# Patient Record
Sex: FEMALE | Race: WHITE | Employment: FULL TIME | URBAN - NONMETROPOLITAN AREA
[De-identification: names, ages, dates, MRNs, and addresses within clinical notes are randomized per-mention and may not be internally consistent; named-entity substitution may affect disease eponyms.]

---

## 2017-01-01 ENCOUNTER — OFFICE VISIT (OUTPATIENT)
Dept: FAMILY MEDICINE | Facility: OTHER | Age: 67
End: 2017-01-01
Attending: FAMILY MEDICINE
Payer: MEDICARE

## 2017-01-01 ENCOUNTER — ONCOLOGY VISIT (OUTPATIENT)
Dept: ONCOLOGY | Facility: CLINIC | Age: 67
End: 2017-01-01
Attending: INTERNAL MEDICINE
Payer: MEDICARE

## 2017-01-01 ENCOUNTER — INFUSION THERAPY VISIT (OUTPATIENT)
Dept: INFUSION THERAPY | Facility: OTHER | Age: 67
End: 2017-01-01
Attending: INTERNAL MEDICINE
Payer: MEDICARE

## 2017-01-01 ENCOUNTER — RESULTS ONLY (OUTPATIENT)
Dept: LAB | Age: 67
End: 2017-01-01

## 2017-01-01 ENCOUNTER — MEDICAL CORRESPONDENCE (OUTPATIENT)
Dept: HEALTH INFORMATION MANAGEMENT | Facility: HOSPITAL | Age: 67
End: 2017-01-01

## 2017-01-01 ENCOUNTER — TRANSFERRED RECORDS (OUTPATIENT)
Dept: HEALTH INFORMATION MANAGEMENT | Facility: HOSPITAL | Age: 67
End: 2017-01-01

## 2017-01-01 ENCOUNTER — TELEPHONE (OUTPATIENT)
Dept: ONCOLOGY | Facility: CLINIC | Age: 67
End: 2017-01-01

## 2017-01-01 ENCOUNTER — DOCUMENTATION ONLY (OUTPATIENT)
Dept: FAMILY MEDICINE | Facility: OTHER | Age: 67
End: 2017-01-01

## 2017-01-01 ENCOUNTER — ONCOLOGY VISIT (OUTPATIENT)
Dept: RADIATION ONCOLOGY | Facility: HOSPITAL | Age: 67
End: 2017-01-01
Attending: RADIOLOGY
Payer: MEDICARE

## 2017-01-01 ENCOUNTER — OFFICE VISIT (OUTPATIENT)
Dept: PEDIATRICS | Facility: OTHER | Age: 67
End: 2017-01-01
Attending: INTERNAL MEDICINE
Payer: MEDICARE

## 2017-01-01 ENCOUNTER — APPOINTMENT (OUTPATIENT)
Dept: LAB | Facility: CLINIC | Age: 67
End: 2017-01-01
Attending: INTERNAL MEDICINE
Payer: MEDICARE

## 2017-01-01 ENCOUNTER — TRANSFERRED RECORDS (OUTPATIENT)
Dept: HEALTH INFORMATION MANAGEMENT | Facility: CLINIC | Age: 67
End: 2017-01-01

## 2017-01-01 ENCOUNTER — TELEPHONE (OUTPATIENT)
Dept: PEDIATRICS | Facility: OTHER | Age: 67
End: 2017-01-01

## 2017-01-01 VITALS
BODY MASS INDEX: 22.53 KG/M2 | RESPIRATION RATE: 16 BRPM | WEIGHT: 132 LBS | HEART RATE: 68 BPM | HEIGHT: 64 IN | SYSTOLIC BLOOD PRESSURE: 122 MMHG | DIASTOLIC BLOOD PRESSURE: 70 MMHG

## 2017-01-01 VITALS
WEIGHT: 134.04 LBS | SYSTOLIC BLOOD PRESSURE: 124 MMHG | TEMPERATURE: 98.9 F | DIASTOLIC BLOOD PRESSURE: 56 MMHG | OXYGEN SATURATION: 99 % | BODY MASS INDEX: 23.01 KG/M2 | RESPIRATION RATE: 16 BRPM | HEART RATE: 77 BPM

## 2017-01-01 VITALS
TEMPERATURE: 99.6 F | BODY MASS INDEX: 21.11 KG/M2 | HEART RATE: 100 BPM | DIASTOLIC BLOOD PRESSURE: 56 MMHG | WEIGHT: 123 LBS | RESPIRATION RATE: 16 BRPM | OXYGEN SATURATION: 99 % | SYSTOLIC BLOOD PRESSURE: 98 MMHG

## 2017-01-01 VITALS
HEART RATE: 74 BPM | SYSTOLIC BLOOD PRESSURE: 93 MMHG | HEIGHT: 64 IN | DIASTOLIC BLOOD PRESSURE: 48 MMHG | TEMPERATURE: 98.1 F | BODY MASS INDEX: 24.04 KG/M2 | RESPIRATION RATE: 16 BRPM | OXYGEN SATURATION: 100 % | WEIGHT: 140.8 LBS

## 2017-01-01 VITALS
SYSTOLIC BLOOD PRESSURE: 124 MMHG | HEART RATE: 69 BPM | BODY MASS INDEX: 22.66 KG/M2 | TEMPERATURE: 98.2 F | DIASTOLIC BLOOD PRESSURE: 72 MMHG | OXYGEN SATURATION: 99 % | WEIGHT: 132 LBS

## 2017-01-01 VITALS
TEMPERATURE: 99.4 F | HEIGHT: 64 IN | OXYGEN SATURATION: 100 % | SYSTOLIC BLOOD PRESSURE: 129 MMHG | WEIGHT: 139 LBS | DIASTOLIC BLOOD PRESSURE: 59 MMHG | HEART RATE: 81 BPM | BODY MASS INDEX: 23.73 KG/M2 | RESPIRATION RATE: 16 BRPM

## 2017-01-01 VITALS
SYSTOLIC BLOOD PRESSURE: 130 MMHG | HEART RATE: 75 BPM | OXYGEN SATURATION: 99 % | RESPIRATION RATE: 16 BRPM | DIASTOLIC BLOOD PRESSURE: 50 MMHG | TEMPERATURE: 97.8 F

## 2017-01-01 DIAGNOSIS — N39.0 URINARY TRACT INFECTION WITHOUT HEMATURIA, SITE UNSPECIFIED: Primary | ICD-10-CM

## 2017-01-01 DIAGNOSIS — C34.90 SMALL CELL LUNG CANCER, UNSPECIFIED LATERALITY (H): Primary | ICD-10-CM

## 2017-01-01 DIAGNOSIS — R60.9 EDEMA: ICD-10-CM

## 2017-01-01 DIAGNOSIS — K21.9 GASTROESOPHAGEAL REFLUX DISEASE, ESOPHAGITIS PRESENCE NOT SPECIFIED: Primary | ICD-10-CM

## 2017-01-01 DIAGNOSIS — K59.01 SLOW TRANSIT CONSTIPATION: Primary | ICD-10-CM

## 2017-01-01 DIAGNOSIS — N39.0 URINARY TRACT INFECTION, SITE UNSPECIFIED: Primary | ICD-10-CM

## 2017-01-01 DIAGNOSIS — C79.51 BONE METASTASIS: ICD-10-CM

## 2017-01-01 DIAGNOSIS — K21.9 GASTROESOPHAGEAL REFLUX DISEASE, ESOPHAGITIS PRESENCE NOT SPECIFIED: ICD-10-CM

## 2017-01-01 DIAGNOSIS — C34.90 SMALL CELL LUNG CANCER, UNSPECIFIED LATERALITY (H): ICD-10-CM

## 2017-01-01 DIAGNOSIS — C79.9 METASTATIC CANCER (H): Primary | ICD-10-CM

## 2017-01-01 DIAGNOSIS — R07.9 CHEST PAIN: ICD-10-CM

## 2017-01-01 DIAGNOSIS — I25.10 CAD (CORONARY ARTERY DISEASE): ICD-10-CM

## 2017-01-01 DIAGNOSIS — C34.12 CANCER OF UPPER LOBE OF LEFT LUNG (H): Primary | ICD-10-CM

## 2017-01-01 DIAGNOSIS — K59.01 SLOW TRANSIT CONSTIPATION: ICD-10-CM

## 2017-01-01 DIAGNOSIS — I25.10 CORONARY ARTERY DISEASE INVOLVING NATIVE CORONARY ARTERY OF NATIVE HEART WITHOUT ANGINA PECTORIS: ICD-10-CM

## 2017-01-01 DIAGNOSIS — R60.9 EDEMA: Primary | ICD-10-CM

## 2017-01-01 DIAGNOSIS — Z45.2 FITTING AND ADJUSTMENT OF VASCULAR CATHETER: ICD-10-CM

## 2017-01-01 DIAGNOSIS — Z45.2 FITTING AND ADJUSTMENT OF VASCULAR CATHETER: Primary | ICD-10-CM

## 2017-01-01 DIAGNOSIS — E78.5 HYPERLIPIDEMIA, UNSPECIFIED HYPERLIPIDEMIA TYPE: ICD-10-CM

## 2017-01-01 DIAGNOSIS — F33.1 MAJOR DEPRESSIVE DISORDER, RECURRENT EPISODE, MODERATE (H): ICD-10-CM

## 2017-01-01 DIAGNOSIS — C79.31 BRAIN METASTASIS: Primary | ICD-10-CM

## 2017-01-01 LAB
ALBUMIN SERPL-MCNC: 3.3 G/DL (ref 3.4–5)
ALBUMIN SERPL-MCNC: 3.5 G/DL (ref 3.4–5)
ALBUMIN UR-MCNC: 100 MG/DL
ALP SERPL-CCNC: 108 U/L (ref 40–150)
ALP SERPL-CCNC: 85 U/L (ref 40–150)
ALT SERPL W P-5'-P-CCNC: 19 U/L (ref 0–50)
ALT SERPL W P-5'-P-CCNC: 26 U/L (ref 0–50)
ANION GAP SERPL CALCULATED.3IONS-SCNC: 6 MMOL/L (ref 3–14)
ANION GAP SERPL CALCULATED.3IONS-SCNC: 7 MMOL/L (ref 3–14)
APPEARANCE UR: ABNORMAL
AST SERPL W P-5'-P-CCNC: 12 U/L (ref 0–45)
AST SERPL W P-5'-P-CCNC: 19 U/L (ref 0–45)
BACTERIA #/AREA URNS HPF: ABNORMAL /HPF
BACTERIA SPEC CULT: ABNORMAL
BASOPHILS # BLD AUTO: 0 10E9/L (ref 0–0.2)
BASOPHILS # BLD AUTO: 0.1 10E9/L (ref 0–0.2)
BASOPHILS NFR BLD AUTO: 0.2 %
BASOPHILS NFR BLD AUTO: 0.8 %
BILIRUB SERPL-MCNC: 0.2 MG/DL (ref 0.2–1.3)
BILIRUB SERPL-MCNC: 0.2 MG/DL (ref 0.2–1.3)
BILIRUB UR QL STRIP: NEGATIVE
BUN SERPL-MCNC: 7 MG/DL (ref 7–30)
BUN SERPL-MCNC: 9 MG/DL (ref 7–30)
CALCIUM SERPL-MCNC: 8.6 MG/DL (ref 8.5–10.1)
CALCIUM SERPL-MCNC: 8.7 MG/DL (ref 8.5–10.1)
CHLORIDE SERPL-SCNC: 103 MMOL/L (ref 94–109)
CHLORIDE SERPL-SCNC: 104 MMOL/L (ref 94–109)
CHOLEST SERPL-MCNC: 163 MG/DL
CO2 SERPL-SCNC: 30 MMOL/L (ref 20–32)
CO2 SERPL-SCNC: 31 MMOL/L (ref 20–32)
COLOR UR AUTO: YELLOW
CREAT SERPL-MCNC: 0.48 MG/DL (ref 0.52–1.04)
CREAT SERPL-MCNC: 0.57 MG/DL (ref 0.52–1.04)
DIFFERENTIAL METHOD BLD: ABNORMAL
DIFFERENTIAL METHOD BLD: ABNORMAL
EOSINOPHIL # BLD AUTO: 0.1 10E9/L (ref 0–0.7)
EOSINOPHIL # BLD AUTO: 0.2 10E9/L (ref 0–0.7)
EOSINOPHIL NFR BLD AUTO: 0.4 %
EOSINOPHIL NFR BLD AUTO: 2.1 %
ERYTHROCYTE [DISTWIDTH] IN BLOOD BY AUTOMATED COUNT: 15.5 % (ref 10–15)
ERYTHROCYTE [DISTWIDTH] IN BLOOD BY AUTOMATED COUNT: 17.7 % (ref 10–15)
GFR SERPL CREATININE-BSD FRML MDRD: ABNORMAL ML/MIN/1.7M2
GFR SERPL CREATININE-BSD FRML MDRD: ABNORMAL ML/MIN/1.7M2
GLUCOSE SERPL-MCNC: 113 MG/DL (ref 70–99)
GLUCOSE SERPL-MCNC: 122 MG/DL (ref 70–99)
GLUCOSE UR STRIP-MCNC: NEGATIVE MG/DL
HCT VFR BLD AUTO: 28.9 % (ref 35–47)
HCT VFR BLD AUTO: 35.2 % (ref 35–47)
HDLC SERPL-MCNC: 64 MG/DL
HGB BLD-MCNC: 11 G/DL (ref 11.7–15.7)
HGB BLD-MCNC: 8.1 G/DL (ref 11.7–15.7)
HGB BLD-MCNC: 9 G/DL (ref 11.7–15.7)
HGB BLD-MCNC: 9 G/DL (ref 11.7–15.7)
HGB UR QL STRIP: ABNORMAL
HYALINE CASTS #/AREA URNS LPF: 29 /LPF (ref 0–2)
IMM GRANULOCYTES # BLD: 0 10E9/L (ref 0–0.4)
IMM GRANULOCYTES # BLD: 0.2 10E9/L (ref 0–0.4)
IMM GRANULOCYTES NFR BLD: 0.4 %
IMM GRANULOCYTES NFR BLD: 1.1 %
KETONES UR STRIP-MCNC: NEGATIVE MG/DL
LDH SERPL L TO P-CCNC: 137 U/L (ref 81–234)
LDLC SERPL CALC-MCNC: 65 MG/DL
LEUKOCYTE ESTERASE UR QL STRIP: ABNORMAL
LYMPHOCYTES # BLD AUTO: 1.3 10E9/L (ref 0.8–5.3)
LYMPHOCYTES # BLD AUTO: 1.5 10E9/L (ref 0.8–5.3)
LYMPHOCYTES NFR BLD AUTO: 18.3 %
LYMPHOCYTES NFR BLD AUTO: 9 %
MAGNESIUM SERPL-MCNC: 2.4 MG/DL (ref 1.6–2.3)
MCH RBC QN AUTO: 31.2 PG (ref 26.5–33)
MCH RBC QN AUTO: 32.4 PG (ref 26.5–33)
MCHC RBC AUTO-ENTMCNC: 31.1 G/DL (ref 31.5–36.5)
MCHC RBC AUTO-ENTMCNC: 31.3 G/DL (ref 31.5–36.5)
MCV RBC AUTO: 100 FL (ref 78–100)
MCV RBC AUTO: 104 FL (ref 78–100)
MICRO REPORT STATUS: ABNORMAL
MICROORGANISM SPEC CULT: ABNORMAL
MONOCYTES # BLD AUTO: 0.7 10E9/L (ref 0–1.3)
MONOCYTES # BLD AUTO: 1.4 10E9/L (ref 0–1.3)
MONOCYTES NFR BLD AUTO: 10.2 %
MONOCYTES NFR BLD AUTO: 8.5 %
NEUTROPHILS # BLD AUTO: 11 10E9/L (ref 1.6–8.3)
NEUTROPHILS # BLD AUTO: 5.6 10E9/L (ref 1.6–8.3)
NEUTROPHILS NFR BLD AUTO: 69.9 %
NEUTROPHILS NFR BLD AUTO: 79.1 %
NITRATE UR QL: NEGATIVE
NONHDLC SERPL-MCNC: 99 MG/DL
NRBC # BLD AUTO: 0 10*3/UL
NRBC # BLD AUTO: 0 10*3/UL
NRBC BLD AUTO-RTO: 0 /100
NRBC BLD AUTO-RTO: 0 /100
PH UR STRIP: 6 PH (ref 4.7–8)
PLATELET # BLD AUTO: 271 10E9/L (ref 150–450)
PLATELET # BLD AUTO: 354 10E9/L (ref 150–450)
POTASSIUM SERPL-SCNC: 3.7 MMOL/L (ref 3.4–5.3)
POTASSIUM SERPL-SCNC: 4.4 MMOL/L (ref 3.4–5.3)
PROT SERPL-MCNC: 7 G/DL (ref 6.8–8.8)
PROT SERPL-MCNC: 7.3 G/DL (ref 6.8–8.8)
RBC # BLD AUTO: 2.78 10E12/L (ref 3.8–5.2)
RBC # BLD AUTO: 3.53 10E12/L (ref 3.8–5.2)
RBC #/AREA URNS AUTO: 180 /HPF (ref 0–2)
SODIUM SERPL-SCNC: 140 MMOL/L (ref 133–144)
SODIUM SERPL-SCNC: 141 MMOL/L (ref 133–144)
SP GR UR STRIP: 1.02 (ref 1–1.03)
SPECIMEN SOURCE: ABNORMAL
SQUAMOUS #/AREA URNS AUTO: 7 /HPF (ref 0–1)
TRANS CELLS #/AREA URNS HPF: 2 /HPF (ref 0–1)
TRIGL SERPL-MCNC: 170 MG/DL
URN SPEC COLLECT METH UR: ABNORMAL
UROBILINOGEN UR STRIP-MCNC: NORMAL MG/DL (ref 0–2)
WBC # BLD AUTO: 13.9 10E9/L (ref 4–11)
WBC # BLD AUTO: 8 10E9/L (ref 4–11)
WBC #/AREA URNS AUTO: >182 /HPF (ref 0–2)
WBC CLUMPS #/AREA URNS HPF: PRESENT /HPF

## 2017-01-01 PROCEDURE — 80053 COMPREHEN METABOLIC PANEL: CPT | Performed by: INTERNAL MEDICINE

## 2017-01-01 PROCEDURE — 99212 OFFICE O/P EST SF 10 MIN: CPT

## 2017-01-01 PROCEDURE — 85018 HEMOGLOBIN: CPT | Performed by: INTERNAL MEDICINE

## 2017-01-01 PROCEDURE — 96372 THER/PROPH/DIAG INJ SC/IM: CPT

## 2017-01-01 PROCEDURE — 36415 COLL VENOUS BLD VENIPUNCTURE: CPT | Performed by: FAMILY MEDICINE

## 2017-01-01 PROCEDURE — 36415 COLL VENOUS BLD VENIPUNCTURE: CPT | Performed by: INTERNAL MEDICINE

## 2017-01-01 PROCEDURE — 87186 SC STD MICRODIL/AGAR DIL: CPT | Performed by: INTERNAL MEDICINE

## 2017-01-01 PROCEDURE — 25000128 H RX IP 250 OP 636: Performed by: INTERNAL MEDICINE

## 2017-01-01 PROCEDURE — 96523 IRRIG DRUG DELIVERY DEVICE: CPT

## 2017-01-01 PROCEDURE — 83735 ASSAY OF MAGNESIUM: CPT | Performed by: INTERNAL MEDICINE

## 2017-01-01 PROCEDURE — 99214 OFFICE O/P EST MOD 30 MIN: CPT | Performed by: INTERNAL MEDICINE

## 2017-01-01 PROCEDURE — 83615 LACTATE (LD) (LDH) ENZYME: CPT | Performed by: INTERNAL MEDICINE

## 2017-01-01 PROCEDURE — 81001 URINALYSIS AUTO W/SCOPE: CPT | Performed by: INTERNAL MEDICINE

## 2017-01-01 PROCEDURE — 85025 COMPLETE CBC W/AUTO DIFF WBC: CPT | Performed by: INTERNAL MEDICINE

## 2017-01-01 PROCEDURE — 99213 OFFICE O/P EST LOW 20 MIN: CPT | Performed by: RADIOLOGY

## 2017-01-01 PROCEDURE — 80061 LIPID PANEL: CPT | Performed by: INTERNAL MEDICINE

## 2017-01-01 PROCEDURE — 85018 HEMOGLOBIN: CPT | Performed by: FAMILY MEDICINE

## 2017-01-01 PROCEDURE — 99207 ZZC NO CHARGE LOS: CPT | Performed by: FAMILY MEDICINE

## 2017-01-01 PROCEDURE — 99213 OFFICE O/P EST LOW 20 MIN: CPT | Performed by: INTERNAL MEDICINE

## 2017-01-01 PROCEDURE — 87088 URINE BACTERIA CULTURE: CPT | Performed by: INTERNAL MEDICINE

## 2017-01-01 PROCEDURE — 87086 URINE CULTURE/COLONY COUNT: CPT | Performed by: INTERNAL MEDICINE

## 2017-01-01 PROCEDURE — 99214 OFFICE O/P EST MOD 30 MIN: CPT | Mod: ZP | Performed by: INTERNAL MEDICINE

## 2017-01-01 RX ORDER — OXYCODONE HYDROCHLORIDE 5 MG/1
5-10 TABLET ORAL EVERY 4 HOURS PRN
Qty: 200 TABLET | Refills: 0 | Status: SHIPPED | OUTPATIENT
Start: 2017-01-01

## 2017-01-01 RX ORDER — BUPROPION HYDROCHLORIDE 150 MG/1
TABLET, EXTENDED RELEASE ORAL
Qty: 90 TABLET | Refills: 0 | Status: SHIPPED | OUTPATIENT
Start: 2017-01-01

## 2017-01-01 RX ORDER — HYDROCODONE BITARTRATE AND ACETAMINOPHEN 5; 325 MG/1; MG/1
TABLET ORAL
Qty: 120 TABLET | Refills: 0 | Status: SHIPPED | OUTPATIENT
Start: 2017-01-01 | End: 2017-01-01 | Stop reason: ALTCHOICE

## 2017-01-01 RX ORDER — NITROGLYCERIN 0.4 MG/1
TABLET SUBLINGUAL
Qty: 100 TABLET | Refills: 0 | Status: SHIPPED | OUTPATIENT
Start: 2017-01-01 | End: 2017-01-01

## 2017-01-01 RX ORDER — NITROFURANTOIN 25; 75 MG/1; MG/1
100 CAPSULE ORAL 2 TIMES DAILY
Qty: 10 CAPSULE | Refills: 0 | Status: SHIPPED | OUTPATIENT
Start: 2017-01-01 | End: 2017-01-01

## 2017-01-01 RX ORDER — FUROSEMIDE 20 MG
TABLET ORAL
Qty: 90 TABLET | Refills: 1 | Status: SHIPPED | OUTPATIENT
Start: 2017-01-01

## 2017-01-01 RX ORDER — OXYCODONE HYDROCHLORIDE 5 MG/1
5-10 TABLET ORAL EVERY 4 HOURS PRN
Qty: 18 TABLET | Refills: 0 | Status: SHIPPED | OUTPATIENT
Start: 2017-01-01

## 2017-01-01 RX ORDER — SIMVASTATIN 20 MG
TABLET ORAL
Qty: 90 TABLET | Refills: 0 | Status: SHIPPED | OUTPATIENT
Start: 2017-01-01

## 2017-01-01 RX ORDER — POTASSIUM CHLORIDE 1.5 G/1.58G
20 POWDER, FOR SOLUTION ORAL 2 TIMES DAILY
COMMUNITY

## 2017-01-01 RX ORDER — MORPHINE SULFATE 30 MG/1
30 TABLET, FILM COATED, EXTENDED RELEASE ORAL EVERY 12 HOURS
Qty: 60 TABLET | Refills: 0 | Status: SHIPPED | OUTPATIENT
Start: 2017-01-01 | End: 2017-01-01

## 2017-01-01 RX ORDER — METOPROLOL SUCCINATE 25 MG/1
25 TABLET, EXTENDED RELEASE ORAL DAILY
Qty: 30 TABLET | Refills: 11 | Status: SHIPPED | OUTPATIENT
Start: 2017-01-01

## 2017-01-01 RX ORDER — HYDROCODONE BITARTRATE AND ACETAMINOPHEN 5; 325 MG/1; MG/1
TABLET ORAL
Refills: 0 | OUTPATIENT
Start: 2017-01-01

## 2017-01-01 RX ORDER — MORPHINE SULFATE 15 MG/1
TABLET ORAL
Qty: 220 TABLET | Refills: 0 | Status: SHIPPED | OUTPATIENT
Start: 2017-01-01 | End: 2017-01-01 | Stop reason: ALTCHOICE

## 2017-01-01 RX ORDER — METOPROLOL SUCCINATE 25 MG/1
25 TABLET, EXTENDED RELEASE ORAL DAILY
Qty: 30 TABLET | Refills: 11 | Status: SHIPPED | OUTPATIENT
Start: 2017-01-01 | End: 2017-01-01

## 2017-01-01 RX ORDER — MORPHINE SULFATE 30 MG/1
30 TABLET, FILM COATED, EXTENDED RELEASE ORAL EVERY 12 HOURS
COMMUNITY
Start: 2017-01-01

## 2017-01-01 RX ORDER — MORPHINE SULFATE 30 MG/1
TABLET, FILM COATED, EXTENDED RELEASE ORAL
Qty: 60 TABLET | Refills: 0 | COMMUNITY
Start: 2017-01-01

## 2017-01-01 RX ORDER — MORPHINE SULFATE 15 MG/1
TABLET ORAL
Refills: 0 | OUTPATIENT
Start: 2017-01-01

## 2017-01-01 RX ORDER — OMEPRAZOLE 40 MG/1
40 CAPSULE, DELAYED RELEASE ORAL DAILY
Qty: 90 CAPSULE | Refills: 3 | Status: SHIPPED | OUTPATIENT
Start: 2017-01-01 | End: 2017-01-01

## 2017-01-01 RX ORDER — SENNOSIDES A AND B 8.6 MG/1
2-4 TABLET, FILM COATED ORAL AT BEDTIME
Qty: 120 TABLET | Refills: 3 | Status: SHIPPED | OUTPATIENT
Start: 2017-01-01

## 2017-01-01 RX ORDER — FUROSEMIDE 20 MG
TABLET ORAL
Qty: 90 TABLET | Refills: 0 | Status: SHIPPED | OUTPATIENT
Start: 2017-01-01 | End: 2017-01-01

## 2017-01-01 RX ORDER — FUROSEMIDE 20 MG
TABLET ORAL
Qty: 90 TABLET | Refills: 0 | Status: SHIPPED | OUTPATIENT
Start: 2017-01-01

## 2017-01-01 RX ORDER — OMEPRAZOLE 40 MG/1
40 CAPSULE, DELAYED RELEASE ORAL DAILY
Qty: 90 CAPSULE | Refills: 3 | Status: SHIPPED | OUTPATIENT
Start: 2017-01-01

## 2017-01-01 RX ADMIN — PEGFILGRASTIM 6 MG: 6 INJECTION SUBCUTANEOUS at 12:59

## 2017-01-01 ASSESSMENT — PAIN SCALES - GENERAL
PAINLEVEL: SEVERE PAIN (7)
PAINLEVEL: MODERATE PAIN (4)
PAINLEVEL: NO PAIN (0)
PAINLEVEL: MODERATE PAIN (4)

## 2017-01-01 ASSESSMENT — ENCOUNTER SYMPTOMS
WEIGHT LOSS: 1
POLYPHAGIA: 0
NAUSEA: 0
HEMATURIA: 0
ARTHRALGIAS: 0
RECTAL PAIN: 0
RECTAL BLEEDING: 0
BLOOD IN STOOL: 0
DIARRHEA: 0
WEIGHT LOSS: 1
STIFFNESS: 0
BLOATING: 0
BOWEL INCONTINENCE: 0
HEARTBURN: 0
WEIGHT GAIN: 0
NECK PAIN: 0
JAUNDICE: 0
NAUSEA: 0
NIGHT SWEATS: 0
POLYDIPSIA: 0
DIZZINESS: 0
DYSURIA: 0
COUGH: 0
WHEEZING: 0
CHILLS: 0
INSOMNIA: 0
DIZZINESS: 1
DIFFICULTY URINATING: 1
SPUTUM PRODUCTION: 0
MUSCLE WEAKNESS: 0
CONSTIPATION: 1
ABDOMINAL PAIN: 1
SHORTNESS OF BREATH: 0
FLANK PAIN: 1
BLURRED VISION: 0
HEARTBURN: 0
NAUSEA: 0
DOUBLE VISION: 0
PALPITATIONS: 0
CONSTIPATION: 1
FEVER: 0
BLOOD IN STOOL: 0
INCREASED ENERGY: 0
MEMORY LOSS: 1
HEADACHES: 0
JOINT SWELLING: 0
BACK PAIN: 1
BACK PAIN: 1
DYSURIA: 1
PALPITATIONS: 0
ALTERED TEMPERATURE REGULATION: 0
FEVER: 0
ABDOMINAL PAIN: 0
SHORTNESS OF BREATH: 0
DECREASED APPETITE: 1
VOMITING: 0
FATIGUE: 1
WHEEZING: 0
HEADACHES: 0
MYALGIAS: 0
CHILLS: 0
CHILLS: 0
HALLUCINATIONS: 0
COUGH: 1
HEARTBURN: 0
HEMATURIA: 0
FEVER: 0
MUSCLE CRAMPS: 0

## 2017-01-01 ASSESSMENT — PATIENT HEALTH QUESTIONNAIRE - PHQ9: SUM OF ALL RESPONSES TO PHQ QUESTIONS 1-9: 3

## 2017-01-09 NOTE — PATIENT INSTRUCTIONS
We will see you back in 4 weeks for your next port flush.  If you have any questions, please call [phone].  If you need a refill, please contact your pharmacy.  Gabriella Pelaez RN

## 2017-01-09 NOTE — MR AVS SNAPSHOT
After Visit Summary   1/9/2017    Joseline Brown    MRN: [id number]           Patient Information     Date Of Birth          1950        Visit Information        Provider Department      1/9/2017 1:00 PM HC INF RM [facility]        Today's Diagnoses     Fitting and adjustment of vascular catheter    -  1     Small cell lung cancer, unspecified laterality (H)           Care Instructions    We will see you back in 4 weeks for your next port flush.  If you have any questions, please call [phone].  If you need a refill, please contact your pharmacy.  Gabriella Pelaez RN          Follow-ups after your visit        Your next 10 appointments already scheduled     Feb 07, 2017  3:30 PM   (Arrive by 3:15 PM)   Return Visit with Almas Greenfield [facility] ([facility] [place])    [address] [place] [address] [phone]              Who to contact     If you have questions or need follow up information about today's clinic visit or your schedule please contact [facility] directly at [phone].  Normal or non-critical lab and imaging results will be communicated to you by MyChart, letter or phone within 4 business days after the clinic has received the results. If you do not hear from us within 7 days, please contact the clinic through amiandohart or phone. If you have a critical or abnormal lab result, we will notify you by phone as soon as possible.  Submit refill requests through [employer] or call your pharmacy and they will forward the refill request to us. Please allow 3 business days for your refill to be completed.          Additional Information About Your Visit        amiandohart Information     [employer] gives you secure access to your electronic health record. If you see a primary care provider, you can also send messages to your care team and make appointments. If you have questions, please call  your primary care clinic.  If you do not have a primary care provider, please call [phone] and they will assist you.        Care EveryWhere ID     This is your Care EveryWhere ID. This could be used by other organizations to access your [place] medical records  [id number]        Your Vitals Were     Pulse Temperature Respirations Pulse Oximetry          75 97.8  F (36.6  C) (Oral) 16 99%         Blood Pressure from Last 3 Encounters:   01/09/17 130/50   12/15/16 104/65   11/22/16 125/54    Weight from Last 3 Encounters:   11/22/16 63.866 kg (140 lb 12.8 oz)   11/08/16 63.05 kg (139 lb)   11/01/16 62.687 kg (138 lb 3.2 oz)              We Performed the Following     Hemoglobin        Primary Care Provider Office Phone # Fax #    Aleksandr Lino [phone]       [facility] [place] [address] [facility]  [facility] [address]        Thank you!     Thank you for choosing [facility]  for your care. Our goal is always to provide you with excellent care. Hearing back from our patients is one way we can continue to improve our services. Please take a few minutes to complete the written survey that you may receive in the mail after your visit with us. Thank you!             Your Updated Medication List - Protect others around you: Learn how to safely use, store and throw away your medicines at www.disposemymeds.org.          This list is accurate as of: 1/9/17  1:20 PM.  Always use your most recent med list.                   Brand Name Dispense Instructions for use    ASPIRIN PO      Take 325 mg by mouth       buPROPion 150 MG 12 hr tablet    WELLBUTRIN SR    90 tablet    Take 1 tablet (150 mg) by mouth daily       Calcium/Magnesium/Zinc Tabs      Take 1 daily       clopidogrel 75 MG tablet    PLAVIX    90 tablet    Take one (1) tablet BY MOUTH daily       COMPAZINE PO          diclofenac 1 % Gel topical gel    VOLTAREN    100 g    Apply to the left upper chest 2-3 times daily.        furosemide 20 MG tablet    LASIX    90 tablet    Take one (1) tablet BY MOUTH daily as needed       HYCAMTIN PO      Take 4 mg by mouth daily       HYDROcodone-acetaminophen 5-325 MG per tablet    NORCO    120 tablet    Take 1-2 tablets by mouth every 6 hours as needed for moderate to severe pain No more than 4gm of acetaminophen daily       isosorbide mononitrate 30 MG 24 hr tablet    IMDUR    60 tablet    Take 2 tablets (60 mg) by mouth daily       metoprolol 25 MG 24 hr tablet    TOPROL-XL    30 tablet    Take 1 tablet (25 mg) by mouth daily       morphine 15 MG IR tablet    MSIR    220 tablet    Take 1-2 tablets by mouth every 4 hours as needed for pain.       nitroglycerin 0.4 MG sublingual tablet    NITROSTAT    100 tablet    TAKE 1 TO 3 TABLETS BY MOUTH AS NEEDED.       OMEPRAZOLE PO      Take 20 mg by mouth daily as needed       simvastatin 20 MG tablet    ZOCOR    90 tablet    Take one (1) tablet BY MOUTH each night at bedtime       ZOFRAN PO      Take by mouth every 6 hours as needed for nausea or vomiting

## 2017-01-09 NOTE — PROGRESS NOTES
Patients right sided port accessed using non-coring, 19 gauge, 3/4 inch needle. Port accessed per facility protocol.  Hand hygiene performed: yes   Mask donned by caregiver: yes Site prepped with CHG: yes Labs drawn: yes Dressing applied using aseptic technique: yes Port flushed easily, without resistance. Flushed with 10 cc's normal saline.   Immediate blood return noted. 10 cc blood discarded.  1 vials blood draw and sent to lab for results.  Port flushed with 20 cc 0.9% normal saline as ordered. Needle removed intact, sterile dressing applied.  Slight pressure applied for 30 seconds.   Patient tolerated port flush well, denies pain nor discomfort at this time. Patient instructed to leave dressing intact for a minimum of one hour, and to call with questions or concerns.  Patient states understanding and is in agreement with this plan. Copy of appointments, and after visit summary (AVS) given to patient.  Patient discharged ambulatory.Gabriella Pelaez RN

## 2017-01-13 NOTE — MR AVS SNAPSHOT
After Visit Summary   1/13/2017    Joseline Brown    MRN: [id number]           Patient Information     Date Of Birth          1950        Visit Information        Provider Department      1/13/2017 1:00 PM HC INF RM [facility]        Today's Diagnoses     Small cell lung cancer, unspecified laterality (H)    -  1     Fitting and adjustment of vascular catheter           Care Instructions    We will see you back for your next appointment.        Follow-ups after your visit        Your next 10 appointments already scheduled     Feb 07, 2017  3:30 PM   (Arrive by 3:15 PM)   Return Visit with Almas Greenfield DO   [facility] ([facility] [place])    [address]  2nd [address]  [facility] [address] [phone]              Who to contact     If you have questions or need follow up information about today's clinic visit or your schedule please contact [facility] directly at [phone].  Normal or non-critical lab and imaging results will be communicated to you by MyChart, letter or phone within 4 business days after the clinic has received the results. If you do not hear from us within 7 days, please contact the clinic through PrimeStonehart or phone. If you have a critical or abnormal lab result, we will notify you by phone as soon as possible.  Submit refill requests through [employer] or call your pharmacy and they will forward the refill request to us. Please allow 3 business days for your refill to be completed.          Additional Information About Your Visit        MyChart Information     [employer] gives you secure access to your electronic health record. If you see a primary care provider, you can also send messages to your care team and make appointments. If you have questions, please call your primary care clinic.  If you do not have a primary care provider, please call [phone] and they will assist you.       "  Care EveryWhere ID     This is your Care EveryWhere ID. This could be used by other organizations to access your [place] medical records  [id number]        Your Vitals Were     Pulse Temperature Respirations Height BMI (Body Mass Index) Pulse Oximetry    74 98.1  F (36.7  C) (Oral) 16 1.626 m (5' 4.02\") 24.16 kg/m2 100%       Blood Pressure from Last 3 Encounters:   01/13/17 93/48   01/09/17 130/50   12/15/16 104/65    Weight from Last 3 Encounters:   01/13/17 63.866 kg (140 lb 12.8 oz)   11/22/16 63.866 kg (140 lb 12.8 oz)   11/08/16 63.05 kg (139 lb)              We Performed the Following     Hemoglobin     Treatment Conditions     Treatment Conditions        Primary Care Provider Office Phone # Fax #    Aleksandr Lino [phone]       [facility] [place] [address] [facility] [address] [facility]  [place] [address]        Thank you!     Thank you for choosing [facility]  for your care. Our goal is always to provide you with excellent care. Hearing back from our patients is one way we can continue to improve our services. Please take a few minutes to complete the written survey that you may receive in the mail after your visit with us. Thank you!             Your Updated Medication List - Protect others around you: Learn how to safely use, store and throw away your medicines at www.disposemymeds.org.          This list is accurate as of: 1/13/17  1:28 PM.  Always use your most recent med list.                   Brand Name Dispense Instructions for use    ASPIRIN PO      Take 325 mg by mouth       buPROPion 150 MG 12 hr tablet    WELLBUTRIN SR    90 tablet    Take 1 tablet (150 mg) by mouth daily       Calcium/Magnesium/Zinc Tabs      Take 1 daily       clopidogrel 75 MG tablet    PLAVIX    90 tablet    Take one (1) tablet BY MOUTH daily       COMPAZINE PO          diclofenac 1 % Gel topical gel    VOLTAREN    100 g    Apply to the left upper chest 2-3 times daily.       furosemide 20 MG " tablet    LASIX    90 tablet    Take one (1) tablet BY MOUTH daily as needed       HYCAMTIN PO      Take 4 mg by mouth daily       HYDROcodone-acetaminophen 5-325 MG per tablet    NORCO    120 tablet    Take 1-2 tablets by mouth every 6 hours as needed for moderate to severe pain No more than 4gm of acetaminophen daily       isosorbide mononitrate 30 MG 24 hr tablet    IMDUR    60 tablet    Take 2 tablets (60 mg) by mouth daily       metoprolol 25 MG 24 hr tablet    TOPROL-XL    30 tablet    Take 1 tablet (25 mg) by mouth daily       morphine 15 MG IR tablet    MSIR    220 tablet    Take 1-2 tablets by mouth every 4 hours as needed for pain.       nitroglycerin 0.4 MG sublingual tablet    NITROSTAT    100 tablet    TAKE 1 TO 3 TABLETS BY MOUTH AS NEEDED.       OMEPRAZOLE PO      Take 20 mg by mouth daily as needed       simvastatin 20 MG tablet    ZOCOR    90 tablet    Take one (1) tablet BY MOUTH each night at bedtime       ZOFRAN PO      Take by mouth every 6 hours as needed for nausea or vomiting

## 2017-01-13 NOTE — PROGRESS NOTES
Patients right sided port accessed using non-coring, 19 gauge, 3/4 needle. Port accessed per facility protocol.    Hand hygiene performed: yes Mask donned by caregiver: yes Site prepped with CHG: yes Labs drawn: yes Dressing applied using aseptic technique: yes Port flushed easily, without resistance. Flushed with 10 cc's normal saline.   Immediate blood return noted. 10 cc blood discarded.  1 vials blood draw and sent to lab for results.  Port flushed with 20 cc 0.9% normal saline.  Needle removed intact, sterile dressing applied.  Slight pressure applied for 30 seconds.   Patient tolerated port flush well, denies pain nor discomfort at this time. Patient instructed to leave dressing intact for a minimum of one hour, and to call with questions or concerns.  Patient states understanding and is in agreement with this plan. Copy of appointments, and after visit summary (AVS) given to patient.  Patient discharged ambulatory.

## 2017-01-18 NOTE — TELEPHONE ENCOUNTER
lasix      Last Written Prescription Date: 5/17/16  Last Fill Quantity: 90, # refills: 0  Last Office Visit with [facility] or [facility] prescribing provider: 4/21/16       POTASSIUM   Date Value Ref Range Status   12/28/2016 3.9 3.4 - 5.3 mmol/L Final     CREATININE   Date Value Ref Range Status   12/28/2016 0.57 0.52 - 1.04 mg/dL Final     BP Readings from Last 3 Encounters:   01/13/17 93/48   01/09/17 130/50   12/15/16 104/65

## 2017-01-23 NOTE — PROGRESS NOTES
Orders for Neulasta received from [facility] Dr. Petersen cosigned by Dr. Lino. Plan updated  Pennie Calix RN

## 2017-01-24 NOTE — MR AVS SNAPSHOT
After Visit Summary   1/24/2017    Joseline Brown    MRN: [id number]           Patient Information     Date Of Birth          1950        Visit Information        Provider Department      1/24/2017 1:00 PM [facility]        Today's Diagnoses     Small cell lung cancer, unspecified laterality (H)    -  1       Care Instructions    We will see you back for your next scheduled appointment.         Follow-ups after your visit        Your next 10 appointments already scheduled     Feb 07, 2017  3:30 PM   (Arrive by 3:15 PM)   Return Visit with Almas Greenfield [facility] ([facility] [place])    [address]  2nd Floor  [facility] [place] [address] [phone]              Who to contact     If you have questions or need follow up information about today's clinic visit or your schedule please contact [facility] directly at [phone].  Normal or non-critical lab and imaging results will be communicated to you by MyChart, letter or phone within 4 business days after the clinic has received the results. If you do not hear from us within 7 days, please contact the clinic through Madronehart or phone. If you have a critical or abnormal lab result, we will notify you by phone as soon as possible.  Submit refill requests through [employer] or call your pharmacy and they will forward the refill request to us. Please allow 3 business days for your refill to be completed.          Additional Information About Your Visit        MyChart Information     [employer] gives you secure access to your electronic health record. If you see a primary care provider, you can also send messages to your care team and make appointments. If you have questions, please call your primary care clinic.  If you do not have a primary care provider, please call [phone] and they will assist you.        Care EveryWhere ID     This is your Care  "EveryWhere ID. This could be used by other organizations to access your [place] medical records  [id number]        Your Vitals Were     Pulse Temperature Respirations Height BMI (Body Mass Index) Pulse Oximetry    81 99.4  F (37.4  C) (Oral) 16 1.626 m (5' 4\") 23.85 kg/m2 100%       Blood Pressure from Last 3 Encounters:   01/24/17 129/59   01/13/17 93/48   01/09/17 130/50    Weight from Last 3 Encounters:   01/24/17 63.05 kg (139 lb)   01/13/17 63.866 kg (140 lb 12.8 oz)   11/22/16 63.866 kg (140 lb 12.8 oz)              We Performed the Following     Nursing Communication 1        Primary Care Provider Office Phone # Fax #    Aleksandr Lino [phone]       [facility] [place] [address]  [place] [address]        Thank you!     Thank you for choosing [facility]  for your care. Our goal is always to provide you with excellent care. Hearing back from our patients is one way we can continue to improve our services. Please take a few minutes to complete the written survey that you may receive in the mail after your visit with us. Thank you!             Your Updated Medication List - Protect others around you: Learn how to safely use, store and throw away your medicines at www.disposemymeds.org.          This list is accurate as of: 1/24/17  1:21 PM.  Always use your most recent med list.                   Brand Name Dispense Instructions for use    ASPIRIN PO      Take 325 mg by mouth       buPROPion 150 MG 12 hr tablet    WELLBUTRIN SR    90 tablet    Take 1 tablet (150 mg) by mouth daily       Calcium/Magnesium/Zinc Tabs      Take 1 daily       clopidogrel 75 MG tablet    PLAVIX    90 tablet    Take one (1) tablet BY MOUTH daily       COMPAZINE PO          diclofenac 1 % Gel topical gel    VOLTAREN    100 g    Apply to the left upper chest 2-3 times daily.       furosemide 20 MG tablet    LASIX    90 tablet    Take one (1) tablet BY MOUTH daily as needed       " HYCAMTIN PO      Take 4 mg by mouth daily       HYDROcodone-acetaminophen 5-325 MG per tablet    NORCO    120 tablet    Take 1-2 tablets by mouth every 6 hours as needed for moderate to severe pain No more than 4gm of acetaminophen daily       isosorbide mononitrate 30 MG 24 hr tablet    IMDUR    60 tablet    Take 2 tablets (60 mg) by mouth daily       metoprolol 25 MG 24 hr tablet    TOPROL-XL    30 tablet    Take 1 tablet (25 mg) by mouth daily       morphine 15 MG IR tablet    MSIR    220 tablet    Take 1-2 tablets by mouth every 4 hours as needed for pain.       nitroglycerin 0.4 MG sublingual tablet    NITROSTAT    100 tablet    TAKE 1 TO 3 TABLETS BY MOUTH AS NEEDED.       OMEPRAZOLE PO      Take 20 mg by mouth daily as needed       simvastatin 20 MG tablet    ZOCOR    90 tablet    Take one (1) tablet BY MOUTH each night at bedtime       ZOFRAN PO      Take by mouth every 6 hours as needed for nausea or vomiting

## 2017-01-24 NOTE — PROGRESS NOTES
Neulasta injected subcutaneously right upper arm.  Tolerated well.  Discharged in care of self.  Gabriella Pelaez RN

## 2017-02-14 NOTE — TELEPHONE ENCOUNTER
simvastatin (ZOCOR) 20 MG tablet    Last Written Prescription Date: 11/09/2016  Last Fill Quantity: 90, # refills: 0    Last Office Visit with [facility] or [facility] prescribing provider:  04/21/2016   Future Office Visit:    Next 5 appointments (look out 90 days)     Mar 14, 2017 10:40 AM CDT   (Arrive by 10:20 AM)   SHORT with Aleksandr Lino DO   [facility]    [address]  [facility] [address]   [phone]                  Cholesterol   Date Value Ref Range Status   08/07/2015 163 <200 mg/dL Final     Comment:     LDL Cholesterol is the primary guide to therapy.   The NCEP recommends further evaluation of: patients with cholesterol greater   than 200 mg/dL if additional risk factors are present, cholesterol greater   than   240 mg/dL, triglycerides greater than 150 mg/dL, or HDL less than 40 mg/dL.       HDL Cholesterol   Date Value Ref Range Status   08/07/2015 47 (L) >50 mg/dL Final     LDL Cholesterol Calculated   Date Value Ref Range Status   08/07/2015 86 0 - 129 mg/dL Final     Comment:     LDL Cholesterol is the primary guide to therapy: LDL-cholesterol goal in high   risk patients is <100 mg/dL and in very high risk patients is <70 mg/dL.       Triglycerides   Date Value Ref Range Status   08/07/2015 148 0 - 150 mg/dL Final     Cholesterol/HDL Ratio   Date Value Ref Range Status   08/07/2015 3.5 0.0 - 5.0 Final     ALT   Date Value Ref Range Status   01/18/2017 19 0 - 50 U/L Final

## 2017-03-07 NOTE — PROGRESS NOTES
REASON FOR VISIT: extensive stage SCLC    CANCER STAGE: extensive stage    HISTORY OF PRESENT ILLNESS:  Ms. Brown is a 66-year-old female with a history of metastatic small cell lung cancer.  She initially presented with pain in her back that led to imaging.  She was found to have a large left upper lobe mass with hilar and mediastinal adenopathy.  PET scan was done, which was consistent with a contralateral lung mass and bony metastasis.  Biopsy on 09/16/2015 revealed small cell lung cancer, which was TTF-1 positive, synaptophysin diffusely positive with possible focal adenocarcinoma component.  MRI of the brain was done, which was negative.    - She is treated primarily at [facility] in [place] by Dr. Parminder Petersen. She is status post carboplatin and etoposide started on 09/26/2015 and apparently she tolerated carboplatin and etoposide well.  She has received a total of 4 full cycles of chemotherapy, finished Nov 2015.  She is also s/p WBRT in January 2016 and status post radiation for lung cancer.   - foundation one testing March 2016: targetable mutations in FGFR1 (equivocal) and PALB2  - evidence of disease progression at restaging in May 2016 and was initiated on IV topotecan. Changed to dose-reduced oral topotecan in August 2016.  - topotecan discontinued in Jan 2017 due to disease progression    Interim history: Joseline is here with her daughter and sister. She [place] of [place] and follows regularly with Dr. Parminder Petersen at [facility] in [place]. She has now been off therapy for about six weeks, after stopping topotecan at the end of January due to disease progression and a pelvic abscess on PET scan 1/31/17. The pelvic abscess was drained in early February; she took a short course of antibiotics. She was never really symptomatic from the abscess - maybe had mild pain and fullness there, but only in retrospect.     She feels well. Has no symptoms attributable to her cancer. No cough, cp/sob, or  f/c. She can easily walk 1/2 mile. Has been traveling to warm locations this winter to see friends. Really has no acute complaints.     Review Of Systems  10-point review of systems were negative except as noted in HPI.      EXAM:  Blood pressure 124/56, pulse 77, temperature 98.9  F (37.2  C), temperature source Oral, resp. rate 16, weight 60.8 kg (134 lb 0.6 oz), SpO2 99 %, not currently breastfeeding.  GEN: alert and oriented, nad  HEENT: perrla, eomi, sclera anicteric, oral mucosa moist without thrush  NECK: supple, no palpable LAD  HT: reg rate and rhythm, no murmurs  LUNGS: clear to auscultation bilaterally  ABD: soft, nt, nd, +bs  EXT: no clubbing, cyanosis, or edema, no bony tenderness  NEURO: CN 3-12 grossly intact    Current Outpatient Prescriptions   Medication Sig Dispense Refill     morphine (MSIR) 15 MG IR tablet Take 1-2 tablets by mouth every 4 hours as needed for pain. 220 tablet 0     HYDROcodone-acetaminophen (NORCO) 5-325 MG per tablet Take one (1) to two (2) tablets BY MOUTH every six (6) HOURS as neededfor pain NO MORE THAN 4 GM (4000MG) OF ACETAMINOPHEN  PER DAY FROM  tablet 0     simvastatin (ZOCOR) 20 MG tablet Take one (1) tablet BY MOUTH each night at bedtime 90 tablet 0     furosemide (LASIX) 20 MG tablet Take one (1) tablet BY MOUTH daily as needed 90 tablet 0     nitroglycerin (NITROSTAT) 0.4 MG sublingual tablet TAKE 1 TO 3 TABLETS BY MOUTH AS NEEDED. 100 tablet 1     clopidogrel (PLAVIX) 75 MG tablet Take one (1) tablet BY MOUTH daily 90 tablet 1     buPROPion (WELLBUTRIN SR) 150 MG 12 hr tablet Take 1 tablet (150 mg) by mouth daily 90 tablet 1     Prochlorperazine Maleate (COMPAZINE PO)        isosorbide mononitrate (IMDUR) 30 MG 24 hr tablet Take 2 tablets (60 mg) by mouth daily 60 tablet 5     OMEPRAZOLE PO Take 20 mg by mouth daily as needed        Ondansetron HCl (ZOFRAN PO) Take by mouth every 6 hours as needed for nausea or vomiting       metoprolol (TOPROL-XL) 25 MG 24  hr tablet Take 1 tablet (25 mg) by mouth daily 30 tablet 1     diclofenac (VOLTAREN) 1 % GEL Apply to the left upper chest 2-3 times daily. 100 g 1     ASPIRIN PO Take 325 mg by mouth       Multiple Minerals (CALCIUM/MAGNESIUM/ZINC) TABS Take 1 daily       Topotecan HCl (HYCAMTIN PO) Take 4 mg by mouth daily Reported on 3/7/2017       Labs  CBC RESULTS:   Recent Labs   Lab Test  03/07/17   1542   WBC  8.0   RBC  3.53*   HGB  11.0*   HCT  35.2   MCV  100   MCH  31.2   MCHC  31.3*   RDW  15.5*   PLT  271     Recent Labs   Lab Test  03/07/17   1542  01/18/17   1310   NA  141  140   POTASSIUM  3.7  4.4   CHLORIDE  104  103   CO2  30  31   ANIONGAP  7  6   GLC  122*  113*   BUN  7  9   CR  0.48*  0.57   SADA  8.6  8.7     Liver Function Studies -   Recent Labs   Lab Test  03/07/17   1542   PROTTOTAL  7.0   ALBUMIN  3.5   BILITOTAL  0.2   ALKPHOS  85   AST  19   ALT  26       Imaging  PET 1/31/17 (see media section)  -left lung nodules slightly increased in size  - left pelvic wall mass, 4x4 cm, hypermetabolic and possible abscess  - treated skeletal lesions  - hypermetabolic lesion inferior sternum    CT cap 3/6/17  Slight progression of left lingular nodule, 2nd nodule in left lung stable, no metastatic disease in abdomen or bones    Assessment/Plan  Extensive stage SCLC: She had slight progression in Jan 2017 while on oral topotecan and is now off therapy for about six weeks. CT scan yesterday again shows only very slight increase in left lung nodule. She has no symptoms from her disease.    Although she has low burden of disease, since this is SCLC that can grow rapidly we recommend starting therapy. She had foundation one testing in 2016 that showed amplification of FGFR1 that is targetable by pozapanib. This has been approved by insurance and we recommend starting it when available. We reviewed the common side effects - diarrhea, HTN, fatigue, cardiac toxicity, LFT abnormalities. Her recent pelvic abscess thought to  be from a perforated diverticuli which is a consideration but the potential benefits likely outweigh the risks.     She is not a candidate for immunotherapy due to h/o SLE and autoimmune hepatits. Another option is a clinical study, and in the next couple months we have a phase 1 trial targeting p53 mutations which are near universally present in SCLC. Since we have pozapanib available now would proceed with that and reserve study at progression.    We will have her f/u in about two months to assess response to pazopanib.       Patient seen and discussed with staff Dr. Jean Pierre Krishna MD  Heme/Onc Fellow  Pager: [phone]    I saw and examined the patient with Dr. Krishna and agree with his note.  Joseline is unfortunately having mild disease progression. Her scan from yesterday done at [place] shows some mild progression off of therapy for 5 weeks or so which is reassuring.  We discussed that she should start her pazopanib soon and she thinks this will be feasible. Then we will restage in 2 months.  I have asked her to come back here at that time as we have a possible clinical trial that may be open by then.  Certainly we discussed that there is little data regarding pazopanib in small cell lung cancer, though there is one phase 2 trial.  Certainly none of those patients had a known FGFR mutation so she may well have good response, but in the case that she does not, I would favor early restaging so we can move onto other options.  Pt agreeable with the plan.  We did briefly discuss potential toxicity of pazopanib and in particular the risk of lack of wound healing/bleeding while on these type of TKIs.     Almas Greenfield   of Medicine  Division of Hematology, Oncology, and Transplantation

## 2017-03-07 NOTE — MR AVS SNAPSHOT
After Visit Summary   3/7/2017    Joseline Brown    MRN: [id number]           Patient Information     Date Of Birth          1950        Visit Information        Provider Department      3/7/2017 4:00 PM Amlas Greenfield DO [facility]        Today's Diagnoses     Small cell lung cancer, unspecified laterality (H)        Hyperlipidemia, unspecified hyperlipidemia type           Follow-ups after your visit        Your next 10 appointments already scheduled     Mar 14, 2017 10:40 AM CDT   (Arrive by 10:20 AM)   SHORT with Aleksandr Lino DO   [facility] [place] ([facility])    [address]  [place] [address]   [phone]            May 23, 2017  3:30 PM CDT   [facility] Lab Draw with  [facility] LAB DRAW   [facility] ([facility])    [address] [place] [address] [id number] [phone]            May 23, 2017  4:00 PM CDT   (Arrive by 3:45 PM)   Return Visit with Almas Greenfield DO   [facility] ([facility])    [address] [place] [address] [id number] [phone]              Who to contact     If you have questions or need follow up information about today's clinic visit or your schedule please contact [facility] directly at [phone].  Normal or non-critical lab and imaging results will be communicated to you by MyChart, letter or phone within 4 business days after the clinic has received the results. If you do not hear from us within 7 days, please contact the clinic through MyChart or phone. If you have a critical or abnormal lab result, we will notify you by phone as soon as possible.  Submit refill requests through [employer] or call your pharmacy and they will forward the refill request to us. Please allow 3 business days for your refill to be completed.          Additional Information About Your  Visit        [employer] Information     [employer] gives you secure access to your electronic health record. If you see a primary care provider, you can also send messages to your care team and make appointments. If you have questions, please call your primary care clinic.  If you do not have a primary care provider, please call [phone] and they will assist you.        Care EveryWhere ID     This is your Care EveryWhere ID. This could be used by other organizations to access your [facility] medical records  [id number]        Your Vitals Were     Pulse Temperature Respirations Pulse Oximetry BMI (Body Mass Index)       77 98.9  F (37.2  C) (Oral) 16 99% 23.01 kg/m2        Blood Pressure from Last 3 Encounters:   03/07/17 124/56   01/24/17 129/59   01/13/17 93/48    Weight from Last 3 Encounters:   03/07/17 60.8 kg (134 lb 0.6 oz)   01/24/17 63 kg (139 lb)   01/13/17 63.9 kg (140 lb 12.8 oz)              We Performed the Following     CBC with platelets differential     Comprehensive metabolic panel     Lipid Profile          Today's Medication Changes          These changes are accurate as of: 3/7/17  5:06 PM.  If you have any questions, ask your nurse or doctor.               These medicines have changed or have updated prescriptions.        Dose/Directions    HYDROcodone-acetaminophen 5-325 MG per tablet   Commonly known as:  NORCO   This may have changed:  See the new instructions.   Used for:  Bone metastasis (H)   Changed by:  Aleksandr Lino, DO        Take one (1) to two (2) tablets BY MOUTH every six (6) HOURS as neededfor pain NO MORE THAN 4 GM (4000MG) OF ACETAMINOPHEN  PER DAY FROM AL   Quantity:  120 tablet   Refills:  0            Where to get your medicines      Some of these will need a paper prescription and others can be bought over the counter.  Ask your nurse if you have questions.     Bring a paper prescription for each of these medications     HYDROcodone-acetaminophen 5-325 MG per tablet     morphine 15 MG IR tablet                Primary Care Provider Office Phone # Fax #    Aleksandr Lino [phone]       [facility] [place] [address]  [place] [address]        Thank you!     Thank you for choosing [facility]  for your care. Our goal is always to provide you with excellent care. Hearing back from our patients is one way we can continue to improve our services. Please take a few minutes to complete the written survey that you may receive in the mail after your visit with us. Thank you!             Your Updated Medication List - Protect others around you: Learn how to safely use, store and throw away your medicines at www.disposemymeds.org.          This list is accurate as of: 3/7/17  5:06 PM.  Always use your most recent med list.                   Brand Name Dispense Instructions for use    ASPIRIN PO      Take 325 mg by mouth       buPROPion 150 MG 12 hr tablet    WELLBUTRIN SR    90 tablet    Take 1 tablet (150 mg) by mouth daily       Calcium/Magnesium/Zinc Tabs      Take 1 daily       clopidogrel 75 MG tablet    PLAVIX    90 tablet    Take one (1) tablet BY MOUTH daily       COMPAZINE PO          diclofenac 1 % Gel topical gel    VOLTAREN    100 g    Apply to the left upper chest 2-3 times daily.       furosemide 20 MG tablet    LASIX    90 tablet    Take one (1) tablet BY MOUTH daily as needed       HYCAMTIN PO      Take 4 mg by mouth daily Reported on 3/7/2017       HYDROcodone-acetaminophen 5-325 MG per tablet    NORCO    120 tablet    Take one (1) to two (2) tablets BY MOUTH every six (6) HOURS as neededfor pain NO MORE THAN 4 GM (4000MG) OF ACETAMINOPHEN  PER DAY FROM AL       isosorbide mononitrate 30 MG 24 hr tablet    IMDUR    60 tablet    Take 2 tablets (60 mg) by mouth daily       metoprolol 25 MG 24 hr tablet    TOPROL-XL    30 tablet    Take 1 tablet (25 mg) by mouth daily       morphine 15 MG IR tablet    MSIR    220  tablet    Take 1-2 tablets by mouth every 4 hours as needed for pain.       nitroglycerin 0.4 MG sublingual tablet    NITROSTAT    100 tablet    TAKE 1 TO 3 TABLETS BY MOUTH AS NEEDED.       OMEPRAZOLE PO      Take 20 mg by mouth daily as needed       simvastatin 20 MG tablet    ZOCOR    90 tablet    Take one (1) tablet BY MOUTH each night at bedtime       ZOFRAN PO      Take by mouth every 6 hours as needed for nausea or vomiting

## 2017-03-07 NOTE — TELEPHONE ENCOUNTER
Pt notified by VM that the written RX is ready at the [facility]  registration to be picked up.

## 2017-03-07 NOTE — TELEPHONE ENCOUNTER
jamalco      Last Written Prescription Date: 12/7/17  Last Fill Quantity: 120,  # refills: 0   Last Office Visit with [facility], [facility] or [facility] prescribing provider: 3/7/17                                         Next 5 appointments (look out 90 days)     Mar 14, 2017 10:40 AM CDT   (Arrive by 10:20 AM)   SHORT with DO Laron Johnston [facility] ([facility]    [address] [place] [address]   [phone]                  morphine      Last Written Prescription Date: 11/30/16  Last Fill Quantity: 220,  # refills: 0   Last Office Visit with [facility], [facility] or [facility] prescribing provider: 3/7/17                                         Next 5 appointments (look out 90 days)     Mar 14, 2017 10:40 AM CDT   (Arrive by 10:20 AM)   SHORT with DO Laron Johnston [facility] ([facility]    [address] [place] [address]   [phone]

## 2017-03-07 NOTE — NURSING NOTE
"Joseline Brown is a 66 year old female who presents for:  Chief Complaint   Patient presents with     Labs Only     Labs drawn peripherally     Oncology Clinic Visit     Small cell lung cancer, unspecified laterality        Initial Vitals:  /56 (BP Location: Right arm, Patient Position: Chair, Cuff Size: Adult Regular)  Pulse 77  Temp 98.9  F (37.2  C) (Oral)  Resp 16  Wt 60.8 kg (134 lb 0.6 oz)  SpO2 99%  BMI 23.01 kg/m2 Estimated body mass index is 23.01 kg/(m^2) as calculated from the following:    Height as of 1/24/17: 1.626 m (5' 4\").    Weight as of this encounter: 60.8 kg (134 lb 0.6 oz).. Body surface area is 1.66 meters squared. BP completed using cuff size: NA (Not Taken)  No Pain (0) No LMP recorded. Patient is postmenopausal. Allergies and medications reviewed.     Medications: Medication refills not needed today.  Pharmacy name entered into [facility]:    [facility] - [place] - [address] [facility] [address] [place], [place] - [address] AT [facility] [address]  [address]    Comments:     7 minutes for nursing intake (face to face time)   Deb Regalado MA        "

## 2017-03-07 NOTE — LETTER
3/7/2017       RE: Joseline Brown     [address] [facility] [address]     Dear Colleague,    Thank you for referring your patient, Joseline Brown, to the [facility]. Please see a copy of my visit note below.    REASON FOR VISIT: extensive stage SCLC    CANCER STAGE: extensive stage    HISTORY OF PRESENT ILLNESS:  Ms. Brown is a 66-year-old female with a history of metastatic small cell lung cancer.  She initially presented with pain in her back that led to imaging.  She was found to have a large left upper lobe mass with hilar and mediastinal adenopathy.  PET scan was done, which was consistent with a contralateral lung mass and bony metastasis.  Biopsy on 09/16/2015 revealed small cell lung cancer, which was TTF-1 positive, synaptophysin diffusely positive with possible focal adenocarcinoma component.  MRI of the brain was done, which was negative.    - She is treated primarily at [facility] in [place] by Dr. Parminder Petersen. She is status post carboplatin and etoposide started on 09/26/2015 and apparently she tolerated carboplatin and etoposide well.  She has received a total of 4 full cycles of chemotherapy, finished Nov 2015.  She is also s/p WBRT in January 2016 and status post radiation for lung cancer.   - foundation one testing March 2016: targetable mutations in FGFR1 (equivocal) and PALB2  - evidence of disease progression at restaging in May 2016 and was initiated on IV topotecan. Changed to dose-reduced oral topotecan in August 2016.  - topotecan discontinued in Jan 2017 due to disease progression    Interim history: Joseline is here with her daughter and sister. She [place] of [place] and follows regularly with Dr. Parminder Petersen at [facility] in [place]. She has now been off therapy for about six weeks, after stopping topotecan at the end of January due to disease progression and a pelvic abscess on PET scan 1/31/17. The pelvic abscess was drained in early  February; she took a short course of antibiotics. She was never really symptomatic from the abscess - maybe had mild pain and fullness there, but only in retrospect.     She feels well. Has no symptoms attributable to her cancer. No cough, cp/sob, or f/c. She can easily walk 1/2 mile. Has been traveling to warm locations this winter to see friends. Really has no acute complaints.     Review Of Systems  10-point review of systems were negative except as noted in HPI.      EXAM:  Blood pressure 124/56, pulse 77, temperature 98.9  F (37.2  C), temperature source Oral, resp. rate 16, weight 60.8 kg (134 lb 0.6 oz), SpO2 99 %, not currently breastfeeding.  GEN: alert and oriented, nad  HEENT: perrla, eomi, sclera anicteric, oral mucosa moist without thrush  NECK: supple, no palpable LAD  HT: reg rate and rhythm, no murmurs  LUNGS: clear to auscultation bilaterally  ABD: soft, nt, nd, +bs  EXT: no clubbing, cyanosis, or edema, no bony tenderness  NEURO: CN 3-12 grossly intact    Current Outpatient Prescriptions   Medication Sig Dispense Refill     morphine (MSIR) 15 MG IR tablet Take 1-2 tablets by mouth every 4 hours as needed for pain. 220 tablet 0     HYDROcodone-acetaminophen (NORCO) 5-325 MG per tablet Take one (1) to two (2) tablets BY MOUTH every six (6) HOURS as neededfor pain NO MORE THAN 4 GM (4000MG) OF ACETAMINOPHEN  PER DAY FROM  tablet 0     simvastatin (ZOCOR) 20 MG tablet Take one (1) tablet BY MOUTH each night at bedtime 90 tablet 0     furosemide (LASIX) 20 MG tablet Take one (1) tablet BY MOUTH daily as needed 90 tablet 0     nitroglycerin (NITROSTAT) 0.4 MG sublingual tablet TAKE 1 TO 3 TABLETS BY MOUTH AS NEEDED. 100 tablet 1     clopidogrel (PLAVIX) 75 MG tablet Take one (1) tablet BY MOUTH daily 90 tablet 1     buPROPion (WELLBUTRIN SR) 150 MG 12 hr tablet Take 1 tablet (150 mg) by mouth daily 90 tablet 1     Prochlorperazine Maleate (COMPAZINE PO)        isosorbide mononitrate (IMDUR) 30 MG  24 hr tablet Take 2 tablets (60 mg) by mouth daily 60 tablet 5     OMEPRAZOLE PO Take 20 mg by mouth daily as needed        Ondansetron HCl (ZOFRAN PO) Take by mouth every 6 hours as needed for nausea or vomiting       metoprolol (TOPROL-XL) 25 MG 24 hr tablet Take 1 tablet (25 mg) by mouth daily 30 tablet 1     diclofenac (VOLTAREN) 1 % GEL Apply to the left upper chest 2-3 times daily. 100 g 1     ASPIRIN PO Take 325 mg by mouth       Multiple Minerals (CALCIUM/MAGNESIUM/ZINC) TABS Take 1 daily       Topotecan HCl (HYCAMTIN PO) Take 4 mg by mouth daily Reported on 3/7/2017       Labs  CBC RESULTS:   Recent Labs   Lab Test  03/07/17   1542   WBC  8.0   RBC  3.53*   HGB  11.0*   HCT  35.2   MCV  100   MCH  31.2   MCHC  31.3*   RDW  15.5*   PLT  271     Recent Labs   Lab Test  03/07/17   1542  01/18/17   1310   NA  141  140   POTASSIUM  3.7  4.4   CHLORIDE  104  103   CO2  30  31   ANIONGAP  7  6   GLC  122*  113*   BUN  7  9   CR  0.48*  0.57   SADA  8.6  8.7     Liver Function Studies -   Recent Labs   Lab Test  03/07/17   1542   PROTTOTAL  7.0   ALBUMIN  3.5   BILITOTAL  0.2   ALKPHOS  85   AST  19   ALT  26       Imaging  PET 1/31/17 (see media section)  -left lung nodules slightly increased in size  - left pelvic wall mass, 4x4 cm, hypermetabolic and possible abscess  - treated skeletal lesions  - hypermetabolic lesion inferior sternum    CT cap 3/6/17  Slight progression of left lingular nodule, 2nd nodule in left lung stable, no metastatic disease in abdomen or bones    Assessment/Plan  Extensive stage SCLC: She had slight progression in Jan 2017 while on oral topotecan and is now off therapy for about six weeks. CT scan yesterday again shows only very slight increase in left lung nodule. She has no symptoms from her disease.    Although she has low burden of disease, since this is SCLC that can grow rapidly we recommend starting therapy. She had foundation one testing in 2016 that showed amplification of FGFR1  that is targetable by pozapanib. This has been approved by insurance and we recommend starting it when available. We reviewed the common side effects - diarrhea, HTN, fatigue, cardiac toxicity, LFT abnormalities. Her recent pelvic abscess thought to be from a perforated diverticuli which is a consideration but the potential benefits likely outweigh the risks.     She is not a candidate for immunotherapy due to h/o SLE and autoimmune hepatits. Another option is a clinical study, and in the next couple months we have a phase 1 trial targeting p53 mutations which are near universally present in SCLC. Since we have pozapanib available now would proceed with that and reserve study at progression.    We will have her f/u in about two months to assess response to pazopanib.       Patient seen and discussed with staff Dr. Jean Pierre Krishna MD  Heme/Onc Fellow  Pager: [phone]    I saw and examined the patient with Dr. Krishna and agree with his note.  Joseline is unfortunately having mild disease progression. Her scan from yesterday done at [place] shows some mild progression off of therapy for 5 weeks or so which is reassuring.  We discussed that she should start her pazopanib soon and she thinks this will be feasible. Then we will restage in 2 months.  I have asked her to come back here at that time as we have a possible clinical trial that may be open by then.  Certainly we discussed that there is little data regarding pazopanib in small cell lung cancer, though there is one phase 2 trial.  Certainly none of those patients had a known FGFR mutation so she may well have good response, but in the case that she does not, I would favor early restaging so we can move onto other options.  Pt agreeable with the plan.  We did briefly discuss potential toxicity of pazopanib and in particular the risk of lack of wound healing/bleeding while on these type of TKIs.     Almas Greenfield   of Medicine  Division of  Hematology, Oncology, and Transplantation

## 2017-03-13 NOTE — MR AVS SNAPSHOT
After Visit Summary   3/13/2017    Joseline Brown    MRN: [id number]           Patient Information     Date Of Birth          1950        Visit Information        Provider Department      3/13/2017 2:00 PM Kip Chin MD [facility]        Today's Diagnoses     Brain metastasis (H)    -  1       Follow-ups after your visit        Your next 10 appointments already scheduled     Mar 14, 2017 10:40 AM CDT   (Arrive by 10:20 AM)   SHORT with Aleksandr Lino DO   [facility] ([facility])    [address]  [facility] [address]   [phone]            May 23, 2017  3:30 PM CDT   [facility] Lab Draw with  [facility] DRAW   [facility] ([facility])    [address] [place] [id number] [phone]            May 23, 2017  4:00 PM CDT   (Arrive by 3:45 PM)   Return Visit with Almas Greenfield DO   [facility] ([facility])    [address] [place] [id number] [phone]              Who to contact     If you have questions or need follow up information about today's clinic visit or your schedule please contact [facility] directly at [phone].  Normal or non-critical lab and imaging results will be communicated to you by MyChart, letter or phone within 4 business days after the clinic has received the results. If you do not hear from us within 7 days, please contact the clinic through MyChart or phone. If you have a critical or abnormal lab result, we will notify you by phone as soon as possible.  Submit refill requests through [employer] or call your pharmacy and they will forward the refill request to us. Please allow 3 business days for your refill to be completed.          Additional Information About Your Visit        [employer] Information     [employer] gives you secure access to your electronic health  "record. If you see a primary care provider, you can also send messages to your care team and make appointments. If you have questions, please call your primary care clinic.  If you do not have a primary care provider, please call [phone] and they will assist you.        Care EveryWhere ID     This is your Care EveryWhere ID. This could be used by other organizations to access your [place] medical records  [id number]        Your Vitals Were     Pulse Respirations Height BMI (Body Mass Index)          68 16 1.626 m (5' 4\") 22.66 kg/m2         Blood Pressure from Last 3 Encounters:   03/13/17 122/70   03/07/17 124/56   01/24/17 129/59    Weight from Last 3 Encounters:   03/13/17 59.9 kg (132 lb)   03/07/17 60.8 kg (134 lb 0.6 oz)   01/24/17 63 kg (139 lb)              Today, you had the following     No orders found for display       Primary Care Provider Office Phone # Fax #    Aleksandr Sheldon Lino [phone]       [facility] [place] [address] [facility]  [place] [address]        Thank you!     Thank you for choosing [facility]  for your care. Our goal is always to provide you with excellent care. Hearing back from our patients is one way we can continue to improve our services. Please take a few minutes to complete the written survey that you may receive in the mail after your visit with us. Thank you!             Your Updated Medication List - Protect others around you: Learn how to safely use, store and throw away your medicines at www.disposemymeds.org.          This list is accurate as of: 3/13/17  3:52 PM.  Always use your most recent med list.                   Brand Name Dispense Instructions for use    ACYCLOVIR PO      Take 400 mg by mouth 2 times daily       ASPIRIN PO      Take 325 mg by mouth       buPROPion 150 MG 12 hr tablet    WELLBUTRIN SR    90 tablet    Take 1 tablet (150 mg) by mouth daily       Calcium/Magnesium/Zinc Tabs      Take 1 daily       " clopidogrel 75 MG tablet    PLAVIX    90 tablet    Take one (1) tablet BY MOUTH daily       COMPAZINE PO          DEXAMETHASONE PO      Take 4 mg by mouth 2 times daily       diclofenac 1 % Gel topical gel    VOLTAREN    100 g    Apply to the left upper chest 2-3 times daily.       furosemide 20 MG tablet    LASIX    90 tablet    Take one (1) tablet BY MOUTH daily as needed       HYDROcodone-acetaminophen 5-325 MG per tablet    NORCO    120 tablet    Take one (1) to two (2) tablets BY MOUTH every six (6) HOURS as neededfor pain NO MORE THAN 4 GM (4000MG) OF ACETAMINOPHEN  PER DAY FROM AL       isosorbide mononitrate 30 MG 24 hr tablet    IMDUR    60 tablet    Take 2 tablets (60 mg) by mouth daily       metoprolol 25 MG 24 hr tablet    TOPROL-XL    30 tablet    Take 1 tablet (25 mg) by mouth daily       morphine 15 MG IR tablet    MSIR    220 tablet    Take 1-2 tablets by mouth every 4 hours as needed for pain.       nitroglycerin 0.4 MG sublingual tablet    NITROSTAT    100 tablet    TAKE 1 TO 3 TABLETS BY MOUTH AS NEEDED.       OMEPRAZOLE PO      Take 20 mg by mouth daily as needed       simvastatin 20 MG tablet    ZOCOR    90 tablet    Take one (1) tablet BY MOUTH each night at bedtime       ZOFRAN PO      Take by mouth every 6 hours as needed for nausea or vomiting

## 2017-03-13 NOTE — PROGRESS NOTES
INITIAL PATIENT ASSESSMENT    Referring Physician: [facility]  Other Physicians: ramin    Diagnosis: brain mets    Prior radiation therapy:   Site Treated: lung  Facility: [facility]  Dates: 2016  Dose: 30Gy    Site Treated: Whole brain  Facility: [facility]  Dates: 2016  Dose: 25Gy    Site Treated: Ribs/Spine  Facility: [facility]  Dates: 2016  Dose: 200cGy    Prior chemotherapy:   Protocol: carboplatin/etoposide  Facility: [facility]  Dates: 2015      Protocol: topotecan  Facility: [facility]  Dates: 2016        Prior hormonal therapy:No    Pain Eval:  Current history of pain associated with this visit:   Intensity: 4/10  Current: dull and aching  Location: back   Treatment: hydrocodone    Psychosocial  Marital Status:    Spouse/Significant other: n/a   Children: 2   Occupation:  at [employer]    Retired: Yes  Living arrangements: home alone  Do you feel safe at home? Yes  Activity status: independent   referral needs: Not needed    Advanced Directive: Yes - Location: pt will bring in    Patient was assessed using the NCCN psychosocial distress thermometer. Patient rated the score as a 0. Patient rated current stressors as n/a. Stressors will be brought to the attention of provider or Oncology RN Care Coordinator for a score of 6 or greater or per nurses discretion.     Pt is here today for a consult for radiation therapy for brain metastases.  Educated patient on the mapping process and the possible side effects of XRT to the brain, to include: fatigue and skin reaction.  Pt verbalizes an understanding and has no questions at this time. Pt is accompanied by her daughter and sister, today.      ROLevel 3- Verification of admission data and rooming completed.  Verification of medication and allergies completed.  Education per individual patient/family needs completed and documented.  Assessment and side-effect management completed.  Necessary information gathered and reported to provider,  time spent assisting patient or coordinating patient needs approximately 45 minutes.
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
## 2017-03-14 NOTE — CONSULTS
RADIATION THERAPY CONSULTATION       REFERRING PROVIDERS:  Dallas Friedman MD, Parminder Petersen MD; [facility],       DIAGNOSIS:  Extensive stage small cell lung carcinoma.      HISTORY OF PRESENT ILLNESS:  Joseline Brown had been evaluated back in 2015 for back and rib pain which led to the discovery of a peripheral left lung nodule, approximately 2 cm in size, with PET/CT and CT scan showing evidence of disease in the left hilar and paratracheal lymph nodes, a small malignant appearing nodule in the right lung just lateral to the hilum and also bone lesions in the left posterior 9th rib and right ilium, as well as L3 vertebral body.  A diagnosis of small cell lung carcinoma was made and she had had a good response to systemic therapy.  She was referred to me then in 12/2015 for possible consolidative and prophylactic treatment.  At that time we entertained that thought; however, she did pursue treatment at [facility] with Dr. Vivas, having received prophylactic cranial irradiation, 2500 cGy in 10 fractions, from 01/07 through 01/20/2016, consolidative therapy 3000 cGy in 10 fractions to her chest at the same time, and then subsequently palliative treatment to a small volume encompassing ribs and thoracic spine in May and June of 2016.  Clinically, she is doing fairly well at this time; however, has recently been found to have evidence of recurrent central nervous system disease with diffuse brain metastases noted on a recent MRI.  She is referred for consideration of radiation oncology consultation and potential therapeutic management and is seeing Dr. Friedman on Wednesday this week to look into further systemic options.      PAST MEDICAL AND SURGICAL HISTORY:   Prior surgeries:   1.  Tubal ligation.   2.  Liver biopsy.   3.  Angioplasty.   4.  Stent.   5.  Abdominal abscess, drained (recently).      Intercurrent illness:   1.  History of lupus.   2.  Primary sclerosing cholangitis.   3.  Hypertension.   4.   Hyperlipidemia.   5.  Autoimmune hepatitis.   6.  Coronary artery disease.   7.  Small cell lung carcinoma as noted above.      ALLERGIES:  Latex, atenolol, Ceftin, contrast dye and Imuran.      REPRODUCTIVE HISTORY:   2, para 2.  Menarche, age 14.  Menopause 50s.      REVIEW OF SYSTEMS:  No diplopia, headaches, dizziness, loss of consciousness.  No swallowing difficulties, odynophagia or dysphagia.  No thermal or temperature intolerance.  No nausea, vomiting, heartburn.  No orthopnea, dyspnea, palpitations.  No abdominal complaints, constipation, bloating, diarrhea.  She does have back pain which she rates approximately 4/10.  She does have some baseline fatigue.  Diet, general.  Weight down 30-40 pounds over the past 1-1/2 years.      FAMILY HISTORY:  Noncontributory.      HABITS:  Alcohol use, rare.  Tobacco, 7-10 pack year history, quit in .      SOCIAL AND DEMOGRAPHIC:  The patient is , has 2 adult offspring.  She has worked at [employer] as an .      PHYSICAL EXAMINATION:   GENERAL:  Reveals a pleasant, cooperative, healthy-appearing female.   VITAL SIGNS:  Weight is 132.3 pounds.  Blood pressure 122/70, heart rate 68.   HEENT:  Extraocular movements, full.  Pupils are equal, round, reactive.  Face is symmetric.  Palate and tongue midline and symmetric.   NECK:  No cervical or supraclavicular lymphadenopathy.   LUNGS:  Clear to auscultation.   HEART:  Cardiac exam reveals regular rate and rhythm without murmurs or extra sounds.  No axillary adenopathy.   ABDOMEN:  Nontender without appreciable organomegaly.  No inguinal lymphadenopathy.   EXTREMITIES:  Extremity strength is intact.  Deep tendon reflexes, symmetric.      RADIOGRAPHIC FINDINGS:  Serial axial imaging is reviewed and shows actually quite a good response to systemic therapy throughout with the exception of recent brain MRI showing multiple metastatic lesions of the cerebellum and supratentorial  portions of the brain, measuring about 8-13 mm, many smaller lesions also noted.      IMPRESSION:  Minimally symptomatic central nervous system disease in a patient who has had otherwise fairly good systemic control of small cell lung carcinoma.      I think it would be very reasonable to consider a second course of palliative radiation therapy in this situation.  I think while there is some concern for neurocognitive dysfunction, we could probably deliver 5483-4932 cGy in 13-15 fractions.  The patient understands there could be some morbidity neurocognitive wise and also of course delayed hair regrowth should that occur in the setting of further systemic therapy.  All in all, she is uncertain as to how she wants to proceed and would like to think about it for a couple of days until she sees Dr. Friedman again in the near future.  We did obtain informed consent and can certainly go ahead and schedule her for treatment simulation and planning should she decide that she wants to undergo treatment.         JOSE MOREL MD             D: 2017 15:37   T: 2017 16:25   MT: raquel      Name:     JOSE HUFF   MRN:      [id number]        Account:       [id number]   :      1950           Consult Date:  2017      Document: [id number]       cc: Dallas Lino DO

## 2017-03-14 NOTE — MR AVS SNAPSHOT
After Visit Summary   3/14/2017    Joseline Brown    MRN: [id number]           Patient Information     Date Of Birth          1950        Visit Information        Provider Department      3/14/2017 10:40 AM Aleksandr Lino DO [facility]        Today's Diagnoses     Slow transit constipation    -  1    Small cell lung cancer, unspecified laterality (H)           Follow-ups after your visit        Follow-up notes from your care team     Return if symptoms worsen or fail to improve.      Your next 10 appointments already scheduled     May 23, 2017  3:30 PM CDT   [facility] Lab Draw with  [email or web address] LAB DRAW   [facility] ([facility]    [address] [place] [id number] [phone]            May 23, 2017  4:00 PM CDT   (Arrive by 3:45 PM)   Return Visit with Almas Greenfield DO   [facility] ([facility]    [address] [place] [id number] [phone]              Who to contact     If you have questions or need follow up information about today's clinic visit or your schedule please contact [facility] directly at [phone].  Normal or non-critical lab and imaging results will be communicated to you by MyChart, letter or phone within 4 business days after the clinic has received the results. If you do not hear from us within 7 days, please contact the clinic through MyChart or phone. If you have a critical or abnormal lab result, we will notify you by phone as soon as possible.  Submit refill requests through [employer] or call your pharmacy and they will forward the refill request to us. Please allow 3 business days for your refill to be completed.          Additional Information About Your Visit        MyChart Information     [employer] gives you secure access to your electronic health record. If you see a primary care  provider, you can also send messages to your care team and make appointments. If you have questions, please call your primary care clinic.  If you do not have a primary care provider, please call [phone] and they will assist you.        Care EveryWhere ID     This is your Care EveryWhere ID. This could be used by other organizations to access your [place] medical records  [id number]        Your Vitals Were     Pulse Temperature Pulse Oximetry BMI (Body Mass Index)          69 98.2  F (36.8  C) (Tympanic) 99% 22.66 kg/m2         Blood Pressure from Last 3 Encounters:   03/14/17 124/72   03/13/17 122/70   03/07/17 124/56    Weight from Last 3 Encounters:   03/14/17 132 lb (59.9 kg)   03/13/17 132 lb (59.9 kg)   03/07/17 134 lb 0.6 oz (60.8 kg)              Today, you had the following     No orders found for display         Today's Medication Changes          These changes are accurate as of: 3/14/17  1:19 PM.  If you have any questions, ask your nurse or doctor.               Start taking these medicines.        Dose/Directions    magnesium hydroxide 400 MG/5ML suspension   Commonly known as:  [employer] MILK OF MAGNESIA   Used for:  Slow transit constipation   Started by:  Aleksandr Lino DO        Dose:  15 mL   Take 15 mLs by mouth every 72 hours   Quantity:  769 mL   Refills:  3       senna 8.6 MG tablet   Commonly known as:  SENOKOT   Used for:  Slow transit constipation   Started by:  Aleksandr Lino DO        Dose:  2-4 tablet   Take 2-4 tablets by mouth At Bedtime   Quantity:  120 tablet   Refills:  3            Where to get your medicines      These medications were sent to [id number] - [place] - [address] [place] [facility] [place] [address], [place] [address]     Phone:  [phone]     magnesium hydroxide 400 MG/5ML suspension    senna 8.6 MG tablet                Primary Care Provider Office Phone # Fax #    Aleksandr Lino [phone]  [phone]       [facility] [place] [address]  [place] [address]        Thank you!     Thank you for choosing [facility]  for your care. Our goal is always to provide you with excellent care. Hearing back from our patients is one way we can continue to improve our services. Please take a few minutes to complete the written survey that you may receive in the mail after your visit with us. Thank you!             Your Updated Medication List - Protect others around you: Learn how to safely use, store and throw away your medicines at www.disposemymeds.org.          This list is accurate as of: 3/14/17  1:19 PM.  Always use your most recent med list.                   Brand Name Dispense Instructions for use    ACYCLOVIR PO      Take 400 mg by mouth 2 times daily       ASPIRIN PO      Take 325 mg by mouth       buPROPion 150 MG 12 hr tablet    WELLBUTRIN SR    90 tablet    Take 1 tablet (150 mg) by mouth daily       Calcium/Magnesium/Zinc Tabs      Take 1 daily       clopidogrel 75 MG tablet    PLAVIX    90 tablet    Take one (1) tablet BY MOUTH daily       COMPAZINE PO          DEXAMETHASONE PO      Take 4 mg by mouth 2 times daily       diclofenac 1 % Gel topical gel    VOLTAREN    100 g    Apply to the left upper chest 2-3 times daily.       furosemide 20 MG tablet    LASIX    90 tablet    Take one (1) tablet BY MOUTH daily as needed       HYDROcodone-acetaminophen 5-325 MG per tablet    NORCO    120 tablet    Take one (1) to two (2) tablets BY MOUTH every six (6) HOURS as neededfor pain NO MORE THAN 4 GM (4000MG) OF ACETAMINOPHEN  PER DAY FROM AL       isosorbide mononitrate 30 MG 24 hr tablet    IMDUR    60 tablet    Take 2 tablets (60 mg) by mouth daily       magnesium hydroxide 400 MG/5ML suspension    CVS MILK OF MAGNESIA    769 mL    Take 15 mLs by mouth every 72 hours       metoprolol 25 MG 24 hr tablet    TOPROL-XL    30 tablet    Take 1 tablet (25 mg) by mouth daily        morphine 15 MG IR tablet    MSIR    220 tablet    Take 1-2 tablets by mouth every 4 hours as needed for pain.       nitroglycerin 0.4 MG sublingual tablet    NITROSTAT    100 tablet    TAKE 1 TO 3 TABLETS BY MOUTH AS NEEDED.       OMEPRAZOLE PO      Take 20 mg by mouth daily as needed       senna 8.6 MG tablet    SENOKOT    120 tablet    Take 2-4 tablets by mouth At Bedtime       simvastatin 20 MG tablet    ZOCOR    90 tablet    Take one (1) tablet BY MOUTH each night at bedtime       ZOFRAN PO      Take by mouth every 6 hours as needed for nausea or vomiting

## 2017-04-07 PROBLEM — Z51.5 PALLIATIVE CARE PATIENT: Status: ACTIVE | Noted: 2017-01-01

## 2017-04-17 NOTE — MR AVS SNAPSHOT
After Visit Summary   4/17/2017    Joseline Brown    MRN: [id number]           Patient Information     Date Of Birth          1950        Visit Information        Provider Department      4/17/2017 3:00 PM Jose Francisco Hernandez MD [facility]        Today's Diagnoses     Small cell lung cancer, unspecified laterality (H)    -  1       Follow-ups after your visit        Follow-up notes from your care team     Return in about 3 months (around 7/17/2017).      Your next 10 appointments already scheduled     May 23, 2017  3:30 PM CDT   [facility] Lab Draw with  [employer] LAB DRAW   [facility]    [address] [place] [address] [phone]            May 23, 2017  4:00 PM CDT   (Arrive by 3:45 PM)   Return Visit with Almas Greenfield DO   [facility] ([facility])    [address] [place] [address] [phone]              Who to contact     If you have questions or need follow up information about today's clinic visit or your schedule please contact [facility] directly at [phone].  Normal or non-critical lab and imaging results will be communicated to you by MyChart, letter or phone within 4 business days after the clinic has received the results. If you do not hear from us within 7 days, please contact the clinic through MyChart or phone. If you have a critical or abnormal lab result, we will notify you by phone as soon as possible.  Submit refill requests through [employer] or call your pharmacy and they will forward the refill request to us. Please allow 3 business days for your refill to be completed.          Additional Information About Your Visit        MyChart Information     [employer] gives you secure access to your electronic health record. If you see a primary care provider, you can also send messages to your care  team and make appointments. If you have questions, please call your primary care clinic.  If you do not have a primary care provider, please call [phone] and they will assist you.        Care EveryWhere ID     This is your Care EveryWhere ID. This could be used by other organizations to access your [place] medical records  [id number]         Blood Pressure from Last 3 Encounters:   04/20/17 98/56   03/14/17 124/72   03/13/17 122/70    Weight from Last 3 Encounters:   04/20/17 123 lb (55.8 kg)   03/14/17 132 lb (59.9 kg)   03/13/17 132 lb (59.9 kg)              Today, you had the following     No orders found for display         Today's Medication Changes          These changes are accurate as of: 4/17/17 11:59 PM.  If you have any questions, ask your nurse or doctor.               These medicines have changed or have updated prescriptions.        Dose/Directions    furosemide 20 MG tablet   Commonly known as:  LASIX   This may have changed:  See the new instructions.   Used for:  Edema   Changed by:  Aleksandr Lino, DO        TAKE 1 TABLET BY MOUTH DAILY AS NEEDED   Quantity:  90 tablet   Refills:  2       * morphine 15 MG IR tablet   Commonly known as:  MSIR   This may have changed:  Another medication with the same name was added. Make sure you understand how and when to take each.   Used for:  Bone metastasis (H)   Changed by:  Aleksandr Lino, DO        Take 1-2 tablets by mouth every 4 hours as needed for pain.   Quantity:  220 tablet   Refills:  0       * morphine 30 MG 12 hr tablet   Commonly known as:  MS CONTIN   This may have changed:  You were already taking a medication with the same name, and this prescription was added. Make sure you understand how and when to take each.   Used for:  Small cell lung cancer, unspecified laterality (H)   Changed by:  Jose Francisco Hernandez MD        Dose:  30 mg   Take 1 tablet (30 mg) by mouth every 12 hours maximum 2 tablet(s) per day   Quantity:   60 tablet   Refills:  0       nitroglycerin 0.4 MG sublingual tablet   Commonly known as:  NITROSTAT   This may have changed:  See the new instructions.   Used for:  Chest pain, unspecified type   Changed by:  Aleksandr Lino DO        TAKE 1 TO 3 TABLETS BY MOUTH AS NEEDED.   Quantity:  100 tablet   Refills:  0       * Notice:  This list has 2 medication(s) that are the same as other medications prescribed for you. Read the directions carefully, and ask your doctor or other care provider to review them with you.         Where to get your medicines      These medications were sent to [facility] - [place], [place] - [address] AT [facility] of [address], [place] [address]     Phone:  [phone]     furosemide 20 MG tablet    nitroglycerin 0.4 MG sublingual tablet         Some of these will need a paper prescription and others can be bought over the counter.  Ask your nurse if you have questions.     Bring a paper prescription for each of these medications     morphine 30 MG 12 hr tablet                Primary Care Provider Office Phone # Fax #    Aleksandr Sheldon Lino [phone]       [facility] [place] [address]  [place] [address]        Thank you!     Thank you for choosing [facility]  for your care. Our goal is always to provide you with excellent care. Hearing back from our patients is one way we can continue to improve our services. Please take a few minutes to complete the written survey that you may receive in the mail after your visit with us. Thank you!             Your Updated Medication List - Protect others around you: Learn how to safely use, store and throw away your medicines at www.disposemymeds.org.          This list is accurate as of: 4/17/17 11:59 PM.  Always use your most recent med list.                   Brand Name Dispense Instructions for use    ACYCLOVIR PO      Take 400 mg by mouth 2 times daily        ASPIRIN PO      Take 325 mg by mouth       Calcium/Magnesium/Zinc Tabs      Reported on 4/20/2017       clopidogrel 75 MG tablet    PLAVIX    90 tablet    Take one (1) tablet BY MOUTH daily       COMPAZINE PO          DEXAMETHASONE PO      Take 4 mg by mouth 2 times daily       diclofenac 1 % Gel topical gel    VOLTAREN    100 g    Apply to the left upper chest 2-3 times daily.       furosemide 20 MG tablet    LASIX    90 tablet    TAKE 1 TABLET BY MOUTH DAILY AS NEEDED       HYDROcodone-acetaminophen 5-325 MG per tablet    NORCO    120 tablet    Take one (1) to two (2) tablets BY MOUTH every six (6) HOURS as neededfor pain NO MORE THAN 4 GM (4000MG) OF ACETAMINOPHEN  PER DAY FROM AL       isosorbide mononitrate 30 MG 24 hr tablet    IMDUR    60 tablet    Take 2 tablets (60 mg) by mouth daily       magnesium hydroxide 400 MG/5ML suspension    CVS MILK OF MAGNESIA    769 mL    Take 15 mLs by mouth every 72 hours       * morphine 15 MG IR tablet    MSIR    220 tablet    Take 1-2 tablets by mouth every 4 hours as needed for pain.       * morphine 30 MG 12 hr tablet    MS CONTIN    60 tablet    Take 1 tablet (30 mg) by mouth every 12 hours maximum 2 tablet(s) per day       nitroglycerin 0.4 MG sublingual tablet    NITROSTAT    100 tablet    TAKE 1 TO 3 TABLETS BY MOUTH AS NEEDED.       senna 8.6 MG tablet    SENOKOT    120 tablet    Take 2-4 tablets by mouth At Bedtime       simvastatin 20 MG tablet    ZOCOR    90 tablet    Take one (1) tablet BY MOUTH each night at bedtime       ZOFRAN PO      Take by mouth every 6 hours as needed for nausea or vomiting       * Notice:  This list has 2 medication(s) that are the same as other medications prescribed for you. Read the directions carefully, and ask your doctor or other care provider to review them with you.

## 2017-04-17 NOTE — PROGRESS NOTES
Palliative Care Initial Visit      Reason for Consultation:  Joseline Brown, 66 year old, female is seen for Palliative Care consultation due to stage 4 metastatic small cell lung cancer.  She is seen at home due to underlying illness making it physically taxing to leave her home.    Advanced Directives:  Full, Honoring Choices     Palliative Care Goals:     Information sharing preferences:  Daughter, sister   Decision making preferences:  Daughter   Patient understanding of illness and prognosis: good   Hopes and concerns:  Symptom control      History of Present Illness:  Joseline has a history of stage 4 metastatic lung cancer involving brain, bone, mediastinal lymph nodes, as well as contralateral lung.  She has underwent palliate chemotherapy with carboplatin and etoposide and transitioned to topotecan.  Joseline has also underwent palliative radiation to painful bone lesions and more recently to brain metastases.initially had response, however her metastatic lesions have progressed.    Unfortunately she developed diverticulitis and underwent partial resection with ostomy placement.    Joseline does note pain in her abdomen as well as her back.  She has been on long and short acting opioids but wishes to change her day regimen due to sedation.    She has had a significant weight loss.  Joseline notes 28 pounds over the last year.    Current Outpatient Prescriptions:      magnesium hydroxide ([employer]S MILK OF MAGNESIA) 400 MG/5ML suspension, Take 15 mLs by mouth every 72 hours, Disp: 769 mL, Rfl: 3     senna (SENOKOT) 8.6 MG tablet, Take 2-4 tablets by mouth At Bedtime, Disp: 120 tablet, Rfl: 3     ACYCLOVIR PO, Take 400 mg by mouth 2 times daily, Disp: , Rfl:      DEXAMETHASONE PO, Take 4 mg by mouth 2 times daily, Disp: , Rfl:      nitroglycerin (NITROSTAT) 0.4 MG sublingual tablet, TAKE 1 TO 3 TABLETS BY MOUTH AS NEEDED., Disp: 100 tablet, Rfl: 0     morphine (MSIR) 15 MG IR tablet, Take 1-2 tablets by mouth every 4 hours as  needed for pain., Disp: 220 tablet, Rfl: 0     HYDROcodone-acetaminophen (NORCO) 5-325 MG per tablet, Take one (1) to two (2) tablets BY MOUTH every six (6) HOURS as neededfor pain NO MORE THAN 4 GM (4000MG) OF ACETAMINOPHEN  PER DAY FROM AL, Disp: 120 tablet, Rfl: 0     simvastatin (ZOCOR) 20 MG tablet, Take one (1) tablet BY MOUTH each night at bedtime, Disp: 90 tablet, Rfl: 0     furosemide (LASIX) 20 MG tablet, Take one (1) tablet BY MOUTH daily as needed, Disp: 90 tablet, Rfl: 0     clopidogrel (PLAVIX) 75 MG tablet, Take one (1) tablet BY MOUTH daily, Disp: 90 tablet, Rfl: 1     buPROPion (WELLBUTRIN SR) 150 MG 12 hr tablet, Take 1 tablet (150 mg) by mouth daily, Disp: 90 tablet, Rfl: 1     Prochlorperazine Maleate (COMPAZINE PO), , Disp: , Rfl:      isosorbide mononitrate (IMDUR) 30 MG 24 hr tablet, Take 2 tablets (60 mg) by mouth daily, Disp: 60 tablet, Rfl: 5     OMEPRAZOLE PO, Take 20 mg by mouth daily as needed , Disp: , Rfl:      Ondansetron HCl (ZOFRAN PO), Take by mouth every 6 hours as needed for nausea or vomiting, Disp: , Rfl:      metoprolol (TOPROL-XL) 25 MG 24 hr tablet, Take 1 tablet (25 mg) by mouth daily, Disp: 30 tablet, Rfl: 1     diclofenac (VOLTAREN) 1 % GEL, Apply to the left upper chest 2-3 times daily., Disp: 100 g, Rfl: 1     ASPIRIN PO, Take 325 mg by mouth, Disp: , Rfl:      Multiple Minerals (CALCIUM/MAGNESIUM/ZINC) TABS, Take 1 daily, Disp: , Rfl:       Allergies   Allergen Reactions     Latex Rash     Atenolol Other (See Comments)     Other reaction(s): Dizziness  Per 4/3/2014 [facility] notes.        Dizziness     Cefuroxime      Per 4/3/2014 [facility] notes.    Per 4/3/2014 [facility] notes.       Cefuroxime Axetil Other (See Comments)     Ceftin     Chlorthalidone Other (See Comments)     Other reaction(s): Dizziness  Dizziness       Contrast Dye Hives     Diagnostic X-Ray Materials Hives     Imuran [Azathioprine]       intolerant  intolerant       Past Medical History:  Past Medical History:   Diagnosis Date     Autoimmune hepatitis (H) 05/09/1991     Coronary artery disease      History of PSVT 05/09/1991     Hyperlipidemia 11/18/2004     Hypertension 03/18/2004     Lupus 05/09/1991     Primary sclerosing cholangitis 2012     Small cell lung cancer (H)     metastatic      Tobacco abuse 05/09/1991         Past Surgical History:   Procedure Laterality Date     ANGIOGRAM  04-    LAD stent no occlusion, Left main no ocllusion, RCA, mid segnent total occlusion.  EF 45%     Bone Density study >  NORMAL  10/02/2002     CARDIAC SURGERY  5-5-14    stent placed     Coronary angiogram ([facility])  4/23/14    Exertional CP, cardiolite no defects.  Story good sent for study.  L Main 2 separtat 80% lesions, RCA- 3 80-90% lesions.  EF~60%,  Inititiall felt CABG candidate.  CTS felt hi risk due to immunsupression. Sent for  ptca attempt.      Coronary Angioplasty ( [facility])  5/1/14    RCA  attempt complicated by dissection & occlusion. Felt small vessel left closed.  Had vfib arrest in CCU  resuscitated after 1 minute.  ECHO post  event showed EF 58% with anterolateral mid akinesis and inferolateral base to mid hypokinesis     Coronary angioplasty ( [facility])\  5/5/14    L Main  ptca + HENRY placed without issue     Currently followed by [facility].  Has dx aof AI hep and Primary sclerosing cholangitis.  SMA-1:320+ & Gamma Globulins up 2.15 g/dl.  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  Has Been stable.  Had liver Bx done but inadequate sample.     LIVER BIOPSY  2012     S/P Cardiolite Stress  (NO ischemia)  01/05/2010    Went 5:42 on [facility] Protocal and had some ST depression inferolateral but cardiolite scan normal no ischemia.  EF~80%     S/P Left Breast Bx > benign       S/P Right Fibula Fracture   2002     S/P tubal Ligation           Family History:  Family History   Problem Relation Age of Onset     C.A.D. Mother      C.A.D. Father        Social History:   reports that she quit smoking about 2 years ago. Her smoking use included Cigarettes. She has a 7.50 pack-year smoking history. She has never used smokeless tobacco. She reports that she drinks alcohol. She reports that she does not use illicit drugs.      Palliative Review of Systems:  She notes no dyspnea or worsening cough.  Family relates significant cognition and memory problems probably related to her brain metastases. Pain as above. The remainder is negative      Physical Examination:  GENERAL:  Chronically ill appearing  HEENT:  Oral cavity without lesions  NECK:  No palpable lymphadenopathy.  No JVD  CV:  Regularrate and rhythm.  No murmur appreciated.  No clicks or rubs.  LUNGS:  Clear to auscultation. Mildly diminished breath sounds.  ABDOMEN:  Soft and nontender.  No palpable masses.  Bowel sounds diminished.  EXTREM:  No edema.  NEURO:  No focal deficits  PSYCH:  Cognition somewhat slowed.  Alert.  Affect is bright.     Laboratory/Imaging:  None.      Assessment/Plan:  Small cell lung cancer, unspecified laterality (H)  Metastatic to multiple sites. Will continue symptom control.  Switch to lower dose long acting opioid in the morning with short acting for breakthrough.  Physician visit in 3 months.      Attestation:   Total time spent on evaluation, counseling, and coordination of care:  90 minutes.     Jose Francisco Hernandez MD

## 2017-04-18 NOTE — TELEPHONE ENCOUNTER
Pt notified that the written RX is ready at the [facility]  registration to be picked up.

## 2017-04-18 NOTE — TELEPHONE ENCOUNTER
metoprolol      Last Written Prescription Date: 4/16/15  Last Fill Quantity: 30, # refills: 1  Last Office Visit with [facility] provider: 4/17/17  Next 5 appointments (look out 90 days)     Apr 20, 2017 10:00 AM CDT   (Arrive by 9:40 AM)   SHORT with DO Laron Johnston [facility] ([facility])    [address] [place] [address]   [phone]                   Potassium   Date Value Ref Range Status   03/07/2017 3.7 3.4 - 5.3 mmol/L Final     Creatinine   Date Value Ref Range Status   03/07/2017 0.48 (L) 0.52 - 1.04 mg/dL Final     BP Readings from Last 3 Encounters:   03/14/17 124/72   03/13/17 122/70   03/07/17 124/56     omeprazole      Last Written Prescription Date: 4/3/2014  Last Fill Quantity: 90,  # refills: 0   Last Office Visit with [facility] prescribing provider: 4/17/17                                         Next 5 appointments (look out 90 days)     Apr 20, 2017 10:00 AM CDT   (Arrive by 9:40 AM)   SHORT with AleksandrDO Laron Guadarrama [facility] ([facility])    [address] [place] [address]   [phone]

## 2017-04-19 NOTE — TELEPHONE ENCOUNTER
norco      Last Written Prescription Date: 3/7/17  Last Fill Quantity: 120,  # refills: 0   Last Office Visit with [facility], [facility] or  Health prescribing provider: 4/17/17                                         Next 5 appointments (look out 90 days)     Apr 20, 2017 10:00 AM CDT   (Arrive by 9:40 AM)   SHORT with DO Laron Johnston [facility] ([facility])    [address] [place] [address]   [phone]                    Ms    Last Written Prescription Date: 3/7/17  Last Fill Quantity:220,  # refills: 0   Last Office Visit with [facility], [facility] or  [facility] prescribing provider: 4/17/17                                         Next 5 appointments (look out 90 days)     Apr 20, 2017 10:00 AM CDT   (Arrive by 9:40 AM)   SHORT with DO Laron [facility] ([facility])    [address] [place] [address]   [phone]

## 2017-04-20 NOTE — NURSING NOTE
"Chief Complaint   Patient presents with     Hospital F/U     Sigmoid colon ressection, colostomy placement and brain radiation march/april 2017    Initial BP 98/56  Pulse 100  Temp 99.6  F (37.6  C)  Resp 16  Wt 123 lb (55.8 kg)  SpO2 99%  BMI 21.11 kg/m2 Estimated body mass index is 21.11 kg/(m^2) as calculated from the following:    Height as of 3/13/17: 5' 4\" (1.626 m).    Weight as of this encounter: 123 lb (55.8 kg).  Medication Reconciliation: complete     Brittney Rojas      "

## 2017-04-20 NOTE — MR AVS SNAPSHOT
After Visit Summary   4/20/2017    Joseline Brown    MRN: [id number]           Patient Information     Date Of Birth          1950        Visit Information        Provider Department      4/20/2017 10:00 AM Aleksandr Lino DO [facility] [place]        Today's Diagnoses     Metastatic cancer (H)    -  1    Coronary artery disease involving native coronary artery of native heart without angina pectoris        Slow transit constipation        Gastroesophageal reflux disease, esophagitis presence not specified        Small cell lung cancer, unspecified laterality (H)           Follow-ups after your visit        Follow-up notes from your care team     Return if symptoms worsen or fail to improve.      Your next 10 appointments already scheduled     May 01, 2017 10:00 AM CDT   (Arrive by 9:45 AM)   Home Initial Visit with DEEPTI Mcfadden CNP   [facility] [place] ([facility])    [address] [place] [address]   [phone]            May 23, 2017  3:30 PM CDT   [facility] Draw with  [facility]   [facility] ([facility])    [address] [place] [id number] [phone]            May 23, 2017  4:00 PM CDT   (Arrive by 3:45 PM)   Return Visit with Almas Greenfield DO   [facility] ([facility])    [address] [place] [id number] [phone]              Who to contact     If you have questions or need follow up information about today's clinic visit or your schedule please contact [facility] [place] directly at [phone].  Normal or non-critical lab and imaging results will be communicated to you by MyChart, letter or phone within 4 business days after the clinic has received the results. If you do not hear from us within 7 days, please contact the clinic through MyChart or phone. If you have a critical  or abnormal lab result, we will notify you by phone as soon as possible.  Submit refill requests through [facility] or call your pharmacy and they will forward the refill request to us. Please allow 3 business days for your refill to be completed.          Additional Information About Your Visit        [facility] Information     [facility] gives you secure access to your electronic health record. If you see a primary care provider, you can also send messages to your care team and make appointments. If you have questions, please call your primary care clinic.  If you do not have a primary care provider, please call [phone] and they will assist you.        Care EveryWhere ID     This is your Care EveryWhere ID. This could be used by other organizations to access your [facility] medical records  [id number]        Your Vitals Were     Pulse Temperature Respirations Pulse Oximetry BMI (Body Mass Index)       100 99.6  F (37.6  C) 16 99% 21.11 kg/m2        Blood Pressure from Last 3 Encounters:   04/20/17 98/56   03/14/17 124/72   03/13/17 122/70    Weight from Last 3 Encounters:   04/20/17 123 lb (55.8 kg)   03/14/17 132 lb (59.9 kg)   03/13/17 132 lb (59.9 kg)              Today, you had the following     No orders found for display         Today's Medication Changes          These changes are accurate as of: 4/20/17 10:29 AM.  If you have any questions, ask your nurse or doctor.               Start taking these medicines.        Dose/Directions    oxyCODONE 5 MG IR tablet   Commonly known as:  ROXICODONE   Used for:  Small cell lung cancer, unspecified laterality (H), Metastatic cancer (H)   Started by:  Aleksandr Lino DO        Dose:  5-10 mg   Take 1-2 tablets (5-10 mg) by mouth every 4 hours as needed for pain maximum 10 tablet(s) per day   Quantity:  18 tablet   Refills:  0         These medicines have changed or have updated prescriptions.        Dose/Directions    morphine 30 MG 12 hr tablet   Commonly  known as:  MS CONTIN   This may have changed:    - additional instructions  - Another medication with the same name was removed. Continue taking this medication, and follow the directions you see here.   Used for:  Small cell lung cancer, unspecified laterality (H)   Changed by:  Aleksandr Lino DO        Dose:  30 mg   Start taking on:  5/15/2017   Take 1 tablet (30 mg) by mouth every 12 hours maximum 2 tablet(s) per day  Takes 30 mg in the AM, 90 mg QHS   Refills:  0         Stop taking these medicines if you haven't already. Please contact your care team if you have questions.     ACYCLOVIR PO   Stopped by:  Aleksandr Lino DO           DEXAMETHASONE PO   Stopped by:  Aleksandr Lino DO           HYDROcodone-acetaminophen 5-325 MG per tablet   Commonly known as:  NORCO   Stopped by:  Aleksandr Lino DO                Where to get your medicines      These medications were sent to [facility] [id number]  [place], [place] - [address] AT [facility] [address], [facility] [address]     Phone:  [phone]     metoprolol 25 MG 24 hr tablet    omeprazole 40 MG capsule         Some of these will need a paper prescription and others can be bought over the counter.  Ask your nurse if you have questions.     Bring a paper prescription for each of these medications     oxyCODONE 5 MG IR tablet                Primary Care Provider Office Phone # Fax #    Aleksandr Lino [phone]       [address] [address]        Thank you!     Thank you for choosing [facility]  for your care. Our goal is always to provide you with excellent care. Hearing back from our patients is one way we can continue to improve our services. Please take a few minutes to complete the written survey that you may receive in the mail after your visit with us. Thank you!             Your Updated Medication List -  Protect others around you: Learn how to safely use, store and throw away your medicines at www.disposemymeds.org.          This list is accurate as of: 4/20/17 10:29 AM.  Always use your most recent med list.                   Brand Name Dispense Instructions for use    ASPIRIN PO      Take 325 mg by mouth       buPROPion 150 MG 12 hr tablet    WELLBUTRIN SR    90 tablet    Take 1 tablet (150 mg) by mouth daily       Calcium/Magnesium/Zinc Tabs      Reported on 4/20/2017       clopidogrel 75 MG tablet    PLAVIX    90 tablet    Take one (1) tablet BY MOUTH daily       COMPAZINE PO          diclofenac 1 % Gel topical gel    VOLTAREN    100 g    Apply to the left upper chest 2-3 times daily.       furosemide 20 MG tablet    LASIX    90 tablet    TAKE 1 TABLET BY MOUTH DAILY AS NEEDED       isosorbide mononitrate 30 MG 24 hr tablet    IMDUR    60 tablet    Take 2 tablets (60 mg) by mouth daily       magnesium hydroxide 400 MG/5ML suspension    CVS MILK OF MAGNESIA    769 mL    Take 15 mLs by mouth every 72 hours       metoprolol 25 MG 24 hr tablet    TOPROL-XL    30 tablet    Take 1 tablet (25 mg) by mouth daily       morphine 30 MG 12 hr tablet   Start taking on:  5/15/2017    MS CONTIN     Take 1 tablet (30 mg) by mouth every 12 hours maximum 2 tablet(s) per day  Takes 30 mg in the AM, 90 mg QHS       nitroglycerin 0.4 MG sublingual tablet    NITROSTAT    100 tablet    TAKE 1 TO 3 TABLETS BY MOUTH AS NEEDED.       omeprazole 40 MG capsule    priLOSEC    90 capsule    Take 1 capsule (40 mg) by mouth daily       oxyCODONE 5 MG IR tablet    ROXICODONE    18 tablet    Take 1-2 tablets (5-10 mg) by mouth every 4 hours as needed for pain maximum 10 tablet(s) per day       potassium chloride 20 MEQ Packet    KLOR-CON     Take 20 mEq by mouth 2 times daily       senna 8.6 MG tablet    SENOKOT    120 tablet    Take 2-4 tablets by mouth At Bedtime       simvastatin 20 MG tablet    ZOCOR    90 tablet    Take one (1) tablet BY  MOUTH each night at bedtime       ZOFRAN PO      Take by mouth every 6 hours as needed for nausea or vomiting

## 2017-04-20 NOTE — PROGRESS NOTES
HPI  Patient is a 65 yo female with metastatic small cell lung cancer with recent noted brain mets who presents for a follow up and update on her progress.  She is no longer on chemotherapy and she has started radiation therapy for her brain mets.  She has also recently undergone colon surgery for diverticulitis with partial resection and ostomy placement.    Review of Systems   Constitutional: Positive for weight loss. Negative for chills and fever.   Respiratory: Positive for cough. Negative for sputum production, shortness of breath and wheezing.    Cardiovascular: Positive for leg swelling. Negative for chest pain and palpitations.   Gastrointestinal: Negative for heartburn and nausea.   Neurological: Positive for dizziness. Negative for headaches.        She has had a few falls and has been unsteady on her feet.     Psychiatric/Behavioral: Positive for memory loss. The patient does not have insomnia.      Past Medical History:   Diagnosis Date     Autoimmune hepatitis (H) 05/09/1991     Coronary artery disease      History of PSVT 05/09/1991     Hyperlipidemia 11/18/2004     Hypertension 03/18/2004     Lupus 05/09/1991     Primary sclerosing cholangitis 2012     Small cell lung cancer (H)     metastatic      Tobacco abuse 05/09/1991     Past Surgical History:   Procedure Laterality Date     ANGIOGRAM  04-    LAD stent no occlusion, Left main no ocllusion, RCA, mid segnent total occlusion.  EF 45%     Bone Density study >  NORMAL  10/02/2002     CARDIAC SURGERY  5-5-14    stent placed     Coronary angiogram ([facility])  4/23/14    Exertional CP, cardiolite no defects.  Story good sent for study.  L Main 2 separtat 80% lesions, RCA- 3 80-90% lesions.  EF~60%,  Inititiall felt CABG candidate.  CTS felt hi risk due to immunsupression. Sent for  ptca attempt.      Coronary Angioplasty ( [facility])  5/1/14    RCA  attempt complicated by dissection & occlusion. Felt small vessel left closed.  Had vfib arrest in CCU   resuscitated after 1 minute.  ECHO post  event showed EF 58% with anterolateral mid akinesis and inferolateral base to mid hypokinesis     Coronary angioplasty ( [facility])\  5/5/14    L Main  ptca + HENRY placed without issue     Currently followed by [facility].  Has dx aof AI hep and Primary sclerosing cholangitis.  SMA-1:320+ & Gamma Globulins up 2.15 g/dl.  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  Has Been stable.  Had liver Bx done but inadequate sample.     GI SURGERY  03/17/2017    sigmoid colon rupture, colostomy placed      LIVER BIOPSY  2012     S/P Cardiolite Stress  (NO ischemia)  01/05/2010    Went 5:42 on [facility] Protocal and had some ST depression inferolateral but cardiolite scan normal no ischemia.  EF~80%     S/P Left Breast Bx > benign       S/P Right Fibula Fracture  2002     S/P tubal Ligation         BP 98/56  Pulse 100  Temp 99.6  F (37.6  C)  Resp 16  Wt 123 lb (55.8 kg)  SpO2 99%  BMI 21.11 kg/m2      Physical Exam   Constitutional: She is oriented to person, place, and time and well-developed, well-nourished, and in no distress. No distress.   Eyes: No scleral icterus.   Neck: Neck supple.   Cardiovascular: Normal rate, regular rhythm, normal heart sounds and intact distal pulses.    No murmur heard.  Pulmonary/Chest: Effort normal and breath sounds normal. She has no wheezes. She has no rales.   Abdominal: Soft. Bowel sounds are normal. She exhibits no distension and no mass. There is no hepatosplenomegaly. There is no tenderness.       Musculoskeletal: She exhibits edema.   Trace edema.   Lymphadenopathy:     She has no cervical adenopathy.   Neurological: She is alert and oriented to person, place, and time.   Psychiatric: Affect normal.       Labs:  Recent Labs   Lab Test  03/07/17   1542  01/18/17   1310   NA  141  140   POTASSIUM  3.7  4.4    CHLORIDE  104  103   CO2  30  31   ANIONGAP  7  6   GLC  122*  113*   BUN  7  9   CR  0.48*  0.57   SADA  8.6  8.7     Lab Results   Component Value Date    ALBUMIN 3.5 03/07/2017     Lab Results   Component Value Date    PROTTOTAL 7.0 03/07/2017     Lab Results   Component Value Date    AST 19 03/07/2017     Lab Results   Component Value Date    ALT 26 03/07/2017     Lab Results   Component Value Date    ALKPHOS 85 03/07/2017       Imaging:  NA      ASSESSMENT /PLAN:  (C80.1) Metastatic cancer (H)  (primary encounter diagnosis)  Comment: Secondary to small cell lung cancer.  She has METS to the brain and bones.  Plan:   oxyCODONE (ROXICODONE) 5 MG IR tablet, 1-2 tablets every 4 hours for pain.  She may require dexamethasone periodically.    (I25.10) Coronary artery disease involving native coronary artery of native heart without angina pectoris  Comment: Stable.  Her blood pressures are running on the low end of normal.  Plan:   She will continue metoprolol (TOPROL-XL) 25 MG 24 hr tablet daily.  She will continue Plavix 75 mg.       (K59.01) Slow transit constipation  Comment:  Secondary to opioids.  Plan:   She will continue Senna 2-4 tablets at bedtime and MOM 15 ml every 3 days.    (K21.9) Gastroesophageal reflux disease, esophagitis presence not specified  Comment: This continues to give her problems without the medications.  Plan:  She will continue omeprazole (PRILOSEC) 40 MG capsule    (C34.90) Small cell lung cancer, unspecified laterality (H)  Comment: She has been having increased pain at night.  Plan:   oxyCODONE (ROXICODONE) 5 MG IR tablet as above and  morphine        (MS CONTIN) 30 MG  In the am 90 mg at bedtime.          Follow up with Provider - JESSIN        Aleksandr Lino DO

## 2017-04-26 NOTE — TELEPHONE ENCOUNTER
lasix      Last Written Prescription Date: 4/19/17  Last Fill Quantity: 90, # refills: 2  Last Office Visit with [facility] or [facility] prescribing provider: 4/20/17       Potassium   Date Value Ref Range Status   03/07/2017 3.7 3.4 - 5.3 mmol/L Final     Creatinine   Date Value Ref Range Status   03/07/2017 0.48 (L) 0.52 - 1.04 mg/dL Final     BP Readings from Last 3 Encounters:   04/20/17 98/56   03/14/17 124/72   03/13/17 122/70     wellbutrin       Last Written Prescription Date: 10/12/16  Last Fill Quantity: 90; # refills: 1  Last Office Visit with [facility], [facility] or [facility] prescribing provider:  4/20/17        Last PHQ-9 score on record=   PHQ-9 SCORE 3/13/2017   Total Score -   Total Score 3       Lab Results   Component Value Date    AST 19 03/07/2017     Lab Results   Component Value Date    ALT 26 03/07/2017

## 2017-05-01 NOTE — PROGRESS NOTES
PHYSICIAN CERTIFICATION OF TERMINAL ILLNESS FOR HOSPICE BENEFIT    April 29, 2017    Joseline Brown    1950      Effective Date of Certification    Start Date:  04-29-17      End Date:  07-27-17    Terminal Diagnosis:    Lung Ca      Secondary Diagnoses:     Brain metastases     Lung metastases, contralateral     Bone metastases     Lymph node metastases, mediastinal      Prognosis Related Comorbidities:    S/P bowel resection secondary to divertulitis, with colostomy     Autoimmune hepatitis     Systemic lupus erythematosis     Coronary heart disease      Hypertension       Narrative Statement:  Client suffers from metastatic lung cancer and is not undergoing disease directed therapy.  She continues to decline. Client resides at home and requires some assistance with ADL's.  Goals of symptom control will be met.  Because of the progressive nature of the illness and associated comorbidities, client qualifies for hospice care.             I certify that this patient is terminally ill and has a life expectancy of 6 months or less if the terminal illness runs its anticipated course.        Attestation:  I confirm that this narrative was composed by myself and is based on review of the medical record and/or examination of the patient.            Jose Francisco Hernandez MD

## 2017-05-05 PROBLEM — Z51.5 HOSPICE CARE PATIENT: Status: ACTIVE | Noted: 2017-01-01

## 2017-05-05 PROBLEM — Z51.5 PALLIATIVE CARE PATIENT: Status: RESOLVED | Noted: 2017-01-01 | Resolved: 2017-01-01

## 2017-05-19 NOTE — TELEPHONE ENCOUNTER
Received call from Hospice Nurse in [place]. Pt has been signed into hospice since 4/29/17 and being followed/managed locally by Dr Petersen.  Requesting follow up appt next week be cancelled.  Will update care team and cancel appt.

## 2017-06-30 ENCOUNTER — TELEPHONE (OUTPATIENT)
Dept: PEDIATRICS | Facility: OTHER | Age: 67
End: 2017-06-30

## 2017-06-30 DIAGNOSIS — C34.90 SMALL CELL LUNG CANCER (H): Primary | ICD-10-CM

## 2017-06-30 NOTE — TELEPHONE ENCOUNTER
Received telephone call and email regarding this patient. [facility] back dated referral from 10-6-16 and 10-27-6 for Dr. Petersen for her Med/Onc. Visits. They are not getting paid for these visits and referrals are needed for these. Fax to Att: Rama [phone]. Thank you